# Patient Record
Sex: FEMALE | Race: OTHER | HISPANIC OR LATINO | Employment: OTHER | URBAN - METROPOLITAN AREA
[De-identification: names, ages, dates, MRNs, and addresses within clinical notes are randomized per-mention and may not be internally consistent; named-entity substitution may affect disease eponyms.]

---

## 2023-07-06 ENCOUNTER — OFFICE VISIT (OUTPATIENT)
Dept: URGENT CARE | Facility: PHYSICIAN GROUP | Age: 53
End: 2023-07-06
Payer: OTHER GOVERNMENT

## 2023-07-06 VITALS
TEMPERATURE: 97.5 F | SYSTOLIC BLOOD PRESSURE: 126 MMHG | HEART RATE: 75 BPM | OXYGEN SATURATION: 96 % | BODY MASS INDEX: 23.56 KG/M2 | WEIGHT: 120 LBS | DIASTOLIC BLOOD PRESSURE: 62 MMHG | HEIGHT: 60 IN | RESPIRATION RATE: 16 BRPM

## 2023-07-06 DIAGNOSIS — S05.02XA ABRASION OF LEFT CORNEA, INITIAL ENCOUNTER: ICD-10-CM

## 2023-07-06 PROCEDURE — 99203 OFFICE O/P NEW LOW 30 MIN: CPT | Performed by: STUDENT IN AN ORGANIZED HEALTH CARE EDUCATION/TRAINING PROGRAM

## 2023-07-06 PROCEDURE — 3074F SYST BP LT 130 MM HG: CPT | Performed by: STUDENT IN AN ORGANIZED HEALTH CARE EDUCATION/TRAINING PROGRAM

## 2023-07-06 PROCEDURE — 3078F DIAST BP <80 MM HG: CPT | Performed by: STUDENT IN AN ORGANIZED HEALTH CARE EDUCATION/TRAINING PROGRAM

## 2023-07-06 RX ORDER — ERYTHROMYCIN 5 MG/G
1 OINTMENT OPHTHALMIC 2 TIMES DAILY
Qty: 3.5 G | Refills: 0 | Status: SHIPPED | OUTPATIENT
Start: 2023-07-06 | End: 2023-07-11

## 2023-07-06 RX ORDER — VALACYCLOVIR HYDROCHLORIDE 500 MG/1
TABLET, FILM COATED ORAL
COMMUNITY
Start: 2023-06-23 | End: 2023-06-30

## 2023-07-06 RX ORDER — DOXYCYCLINE HYCLATE 100 MG/1
100 CAPSULE ORAL 2 TIMES DAILY
COMMUNITY
Start: 2023-06-19 | End: 2023-06-30

## 2023-07-06 ASSESSMENT — VISUAL ACUITY: OU: 1

## 2023-07-07 NOTE — PROGRESS NOTES
Subjective:   Елена Fowler is a 52 y.o. female who presents for Eye Drainage (L eye, super watery, mucus like substance in eye, feels like something fuzzy is in her eyes, started last night when she was outside and it was windy )      HPI:  Pleasant 52-year-old female presents the urgent care for left eye irritation and redness that started yesterday.  She states that she was outside in the wind and felt like something got in her eye.  She states that it was instantly irritated and she rubbed her eye to try to alleviate the sensation.  She did flush it with water as well.  Orts that the irritation is to the lateral aspect of her left eye.  Denies fever, chills, blurred vision, double vision, nausea, vomiting, or headache.  She does report increased watering to the eye.  States it has slightly improved today but is still causing her issues.    Medications:    doxycycline Caps  erythromycin Oint  valACYclovir Tabs    Allergies: Benadryl [diphenhydramine] and Penicillins    Problem List: Елена Fowler does not have a problem list on file.    Surgical History:  No past surgical history on file.    Past Social Hx: Елена Fowler       Past Family Hx:  Елена Fowler family history is not on file.     Problem list, medications, and allergies reviewed by myself today in Epic.     Objective:     /62 (BP Location: Right arm, Patient Position: Sitting, BP Cuff Size: Adult)   Pulse 75   Temp 36.4 °C (97.5 °F) (Temporal)   Resp 16   Ht 1.524 m (5')   Wt 54.4 kg (120 lb)   SpO2 96%   BMI 23.44 kg/m²     Physical Exam  Vitals reviewed.   Constitutional:       General: She is not in acute distress.     Appearance: Normal appearance.   HENT:      Head: Normocephalic.      Mouth/Throat:      Mouth: Mucous membranes are moist.   Eyes:      General: Lids are normal. Vision grossly intact. No visual field deficit.        Right eye: No foreign body, discharge or hordeolum.         Left eye: No  foreign body, discharge or hordeolum.      Extraocular Movements: Extraocular movements intact.      Right eye: Normal extraocular motion.      Left eye: Normal extraocular motion.      Conjunctiva/sclera:      Right eye: Right conjunctiva is not injected.      Left eye: Left conjunctiva is injected.      Pupils: Pupils are equal, round, and reactive to light.        Comments: Fluorescein eye exam conducted which shows an approximate 7 mm corneal abrasion to the lateral aspect of the left eye.  No ulceration or discharge.   Cardiovascular:      Rate and Rhythm: Normal rate.      Pulses: Normal pulses.   Pulmonary:      Effort: Pulmonary effort is normal.   Skin:     General: Skin is warm and dry.   Neurological:      Mental Status: She is alert.         Assessment/Plan:     Diagnosis and associated orders:     1. Abrasion of left cornea, initial encounter  erythromycin 5 MG/GM Ointment         Comments/MDM:     Patient's presentation physical exam findings are consistent with corneal abrasion to left eye.  No signs of foreign body still retained at this time.  Erythromycin ointment started for prophylactic bacterial coverage.  Referral to ophthalmology for further evaluation management to make sure that this fully heals.  Denies any acute change in vision.  No blurred vision, double vision, or severe eye pain.  ED/return precautions given.         Differential diagnosis, natural history, supportive care, and indications for immediate follow-up discussed.    Advised the patient to follow-up with the primary care physician for recheck, reevaluation, and consideration of further management.    Please note that this dictation was created using voice recognition software. I have made a reasonable attempt to correct obvious errors, but I expect that there are errors of grammar and possibly content that I did not discover before finalizing the note.    Electronically signed by Shay Mcmillan PA-C.

## 2023-09-19 ENCOUNTER — OFFICE VISIT (OUTPATIENT)
Dept: URGENT CARE | Facility: PHYSICIAN GROUP | Age: 53
End: 2023-09-19
Payer: OTHER GOVERNMENT

## 2023-09-19 VITALS
SYSTOLIC BLOOD PRESSURE: 126 MMHG | WEIGHT: 120 LBS | RESPIRATION RATE: 16 BRPM | HEIGHT: 60 IN | HEART RATE: 69 BPM | TEMPERATURE: 98.4 F | DIASTOLIC BLOOD PRESSURE: 84 MMHG | BODY MASS INDEX: 23.56 KG/M2 | OXYGEN SATURATION: 95 %

## 2023-09-19 DIAGNOSIS — M54.40 BACK PAIN OF LUMBAR REGION WITH SCIATICA: ICD-10-CM

## 2023-09-19 PROCEDURE — 99214 OFFICE O/P EST MOD 30 MIN: CPT | Performed by: PHYSICIAN ASSISTANT

## 2023-09-19 PROCEDURE — 3079F DIAST BP 80-89 MM HG: CPT | Performed by: PHYSICIAN ASSISTANT

## 2023-09-19 PROCEDURE — 3074F SYST BP LT 130 MM HG: CPT | Performed by: PHYSICIAN ASSISTANT

## 2023-09-19 RX ORDER — PREDNISONE 10 MG/1
40 TABLET ORAL DAILY
Qty: 20 TABLET | Refills: 0 | Status: SHIPPED | OUTPATIENT
Start: 2023-09-19 | End: 2023-09-24

## 2023-09-19 RX ORDER — CYCLOBENZAPRINE HCL 5 MG
5-10 TABLET ORAL
Qty: 15 TABLET | Refills: 0 | Status: SHIPPED | OUTPATIENT
Start: 2023-09-19 | End: 2023-09-30

## 2023-09-19 RX ORDER — KETOROLAC TROMETHAMINE 30 MG/ML
30 INJECTION, SOLUTION INTRAMUSCULAR; INTRAVENOUS ONCE
Status: COMPLETED | OUTPATIENT
Start: 2023-09-19 | End: 2023-09-19

## 2023-09-19 RX ADMIN — KETOROLAC TROMETHAMINE 30 MG: 30 INJECTION, SOLUTION INTRAMUSCULAR; INTRAVENOUS at 09:14

## 2023-09-19 ASSESSMENT — ENCOUNTER SYMPTOMS
BACK PAIN: 1
EYE PAIN: 0
SHORTNESS OF BREATH: 0
NAUSEA: 0
MYALGIAS: 0
SORE THROAT: 0
ABDOMINAL PAIN: 0
CHILLS: 0
HEADACHES: 0
VOMITING: 0
COUGH: 0
CONSTIPATION: 0
FEVER: 0
DIARRHEA: 0

## 2023-09-19 NOTE — PROGRESS NOTES
Subjective:   Елена Fowler is a 52 y.o. female who presents for Other (Sciatica pain, x1 week )      Is a 52-year-old female who notes prior medical history of sciatica that improved with epidural injections, last injection was around 1 year ago.  They recently moved to the area and in the process of moving around 7 to 10 days ago started noticing left-sided lumbar back pain as well as a recurrence of her left-sided sciatica.  Originally pain radiated to the left knee and is now moving distally to the foot.  She denies any numbness, weakness, saddle anesthesia, bladder or bowel incontinence    Review of Systems   Constitutional:  Negative for chills and fever.   HENT:  Negative for congestion, ear pain and sore throat.    Eyes:  Negative for pain.   Respiratory:  Negative for cough and shortness of breath.    Cardiovascular:  Negative for chest pain.   Gastrointestinal:  Negative for abdominal pain, constipation, diarrhea, nausea and vomiting.   Genitourinary:  Negative for dysuria.   Musculoskeletal:  Positive for back pain. Negative for myalgias.   Skin:  Negative for rash.   Neurological:  Negative for headaches.       Medications, Allergies, and current problem list reviewed today in Epic.     Objective:     /84 (BP Location: Left arm, Patient Position: Sitting, BP Cuff Size: Adult)   Pulse 69   Temp 36.9 °C (98.4 °F) (Temporal)   Resp 16   Ht 1.524 m (5')   Wt 54.4 kg (120 lb)   SpO2 95%     Physical Exam  Vitals reviewed.   Constitutional:       Appearance: Normal appearance.   HENT:      Head: Normocephalic and atraumatic.      Right Ear: External ear normal.      Left Ear: External ear normal.      Nose: Nose normal.      Mouth/Throat:      Mouth: Mucous membranes are moist.   Eyes:      Conjunctiva/sclera: Conjunctivae normal.   Cardiovascular:      Rate and Rhythm: Normal rate.   Pulmonary:      Effort: Pulmonary effort is normal.   Musculoskeletal:      Comments: Left lumbar paraspinal  and SI joint TTP with muscle spasm.  Flexion extension of the lumbar as well as hips reproduces patient's sciatic pain, unable to tolerate straight leg raise.  Symmetrical 2+ patellar reflexes, ambulatory with a steady station and gait, 5 out of 5 strength of bilateral lower extremities   Skin:     General: Skin is warm and dry.      Capillary Refill: Capillary refill takes less than 2 seconds.   Neurological:      Mental Status: She is alert and oriented to person, place, and time.         Assessment/Plan:     Diagnosis and associated orders:     1. Back pain of lumbar region with sciatica  predniSONE (DELTASONE) 10 MG Tab    ketorolac (Toradol) injection 30 mg    cyclobenzaprine (FLEXERIL) 5 mg tablet    Referral to Pain Clinic         Comments/MDM:     Toradol provided in clinic, Flexeril instructed to be used only before bed, patient warned about sedation.  Start prednisone today.  No NSAIDs for the next 24 hours after Toradol injection and then can restart alternating Tylenol and ibuprofen.  Provided clinical advisor printed out on lumbar back pain with emphasis on sciatica stretching.  Referral to interventional pain as she has benefited from this in the past.  No sign of cord compression syndrome, follow-up as needed         Differential diagnosis, natural history, supportive care, and indications for immediate follow-up discussed.    Advised the patient to follow-up with the primary care physician for recheck, reevaluation, and consideration of further management.    Please note that this dictation was created using voice recognition software. I have made a reasonable attempt to correct obvious errors, but I expect that there are errors of grammar and possibly content that I did not discover before finalizing the note.    This note was electronically signed by Jhonatan Fischer PA-C

## 2023-09-29 ENCOUNTER — TELEPHONE (OUTPATIENT)
Dept: HEALTH INFORMATION MANAGEMENT | Facility: OTHER | Age: 53
End: 2023-09-29
Payer: OTHER GOVERNMENT

## 2023-10-12 ENCOUNTER — OFFICE VISIT (OUTPATIENT)
Dept: URGENT CARE | Facility: PHYSICIAN GROUP | Age: 53
End: 2023-10-12
Payer: OTHER GOVERNMENT

## 2023-10-12 VITALS
BODY MASS INDEX: 25.52 KG/M2 | OXYGEN SATURATION: 93 % | TEMPERATURE: 97.6 F | SYSTOLIC BLOOD PRESSURE: 124 MMHG | HEART RATE: 71 BPM | DIASTOLIC BLOOD PRESSURE: 74 MMHG | RESPIRATION RATE: 20 BRPM | WEIGHT: 130 LBS | HEIGHT: 60 IN

## 2023-10-12 DIAGNOSIS — B00.9 HERPES: ICD-10-CM

## 2023-10-12 PROCEDURE — 3078F DIAST BP <80 MM HG: CPT

## 2023-10-12 PROCEDURE — 99213 OFFICE O/P EST LOW 20 MIN: CPT

## 2023-10-12 PROCEDURE — 3074F SYST BP LT 130 MM HG: CPT

## 2023-10-12 RX ORDER — VALACYCLOVIR HYDROCHLORIDE 500 MG/1
500 TABLET, FILM COATED ORAL 2 TIMES DAILY
Qty: 6 TABLET | Refills: 2 | Status: SHIPPED | OUTPATIENT
Start: 2023-10-12 | End: 2023-10-15

## 2023-10-12 NOTE — PROGRESS NOTES
Subjective     Елена Fowler is a very pleasant 52 y.o. female who presents with Other (Personal )            HPI patient arrives here with her .  Patient states she does have a history of genital herpes, she states whenever she has a lot of stress she will have an outbreak.  She typically has a prescription of valacyclovir at home to take, at 500 mg twice a day for 3 days when this occurs.  However she and her  just moved to [place] from [facility] in June.  With the stress of the move she had used up her 1 prescription she had left.  She states he did have a lot of company the last few days, and she began to feel that she was having an outbreak yesterday.  She states she does have an appointment to establish with a PCP here at the [facility] in December, she is hoping to be treated at this time and possibly have a refill in case she has an outbreak prior to her next appointment.      ROS Same as above        Allergies   Allergen Reactions    Benadryl [Diphenhydramine]     Penicillins        Current Outpatient Medications   Medication Sig    valACYclovir (VALTREX) 500 MG Tab Take 1 Tablet by mouth 2 times a day for 3 days.        No past medical history on file.       Objective     /74 (BP Location: Right arm, Patient Position: Sitting, BP Cuff Size: Adult)   Pulse 71   Temp 36.4 °C (97.6 °F) (Temporal)   Resp 20   Ht 1.524 m (5')   Wt 59 kg (130 lb)   SpO2 93%   BMI 25.39 kg/m²      Physical Exam  Vitals reviewed.   Constitutional:       Appearance: Normal appearance. She is not ill-appearing.   HENT:      Head: Normocephalic and atraumatic.      Mouth/Throat:      Mouth: Mucous membranes are moist.   Eyes:      Extraocular Movements: Extraocular movements intact.      Conjunctiva/sclera: Conjunctivae normal.   Cardiovascular:      Rate and Rhythm: Normal rate.   Pulmonary:      Effort: Pulmonary effort is normal.   Musculoskeletal:         General: Normal  range of motion.      Cervical back: Normal range of motion and neck supple.   Skin:     General: Skin is warm and dry.   Neurological:      Mental Status: She is alert and oriented to person, place, and time.           Assessment & Plan     Patient comes in today with her .  She states she does have a history of genital herpes, which she has had for many years.  She states yesterday she started to feel like she was going to have an outbreak, the discomfort has continued.  She and her  are both retired and they recently moved to the [facility] and she does not currently have a primary care provider.  She does have an appointment in December to get established for further medical management.    She does decline physical exam today in clinic.  We discussed her history.  Patient verifies that valacyclovir at 500 mg 2 times daily for 3 days has always taken care of any outbreaks for her in the past.  Discussed we will write for valacyclovir 500 mg 2 times daily for 3 days with 2 refills for her in case she needs them for any outbreaks prior to her appointment in December to establish with primary care.  Patient and spouse's understanding and agreement plan of care. Differential diagnosis, natural history, supportive care, and indications for immediate follow-up were discussed.       1. Herpes  valACYclovir (VALTREX) 500 MG Tab

## 2023-12-08 SDOH — ECONOMIC STABILITY: INCOME INSECURITY: HOW HARD IS IT FOR YOU TO PAY FOR THE VERY BASICS LIKE FOOD, HOUSING, MEDICAL CARE, AND HEATING?: PATIENT DECLINED

## 2023-12-08 SDOH — ECONOMIC STABILITY: HOUSING INSECURITY: IN THE LAST 12 MONTHS, HOW MANY PLACES HAVE YOU LIVED?: 3

## 2023-12-08 SDOH — ECONOMIC STABILITY: HOUSING INSECURITY
IN THE LAST 12 MONTHS, WAS THERE A TIME WHEN YOU DID NOT HAVE A STEADY PLACE TO SLEEP OR SLEPT IN A SHELTER (INCLUDING NOW)?: PATIENT REFUSED

## 2023-12-08 SDOH — ECONOMIC STABILITY: FOOD INSECURITY: WITHIN THE PAST 12 MONTHS, THE FOOD YOU BOUGHT JUST DIDN'T LAST AND YOU DIDN'T HAVE MONEY TO GET MORE.: PATIENT DECLINED

## 2023-12-08 SDOH — HEALTH STABILITY: MENTAL HEALTH
STRESS IS WHEN SOMEONE FEELS TENSE, NERVOUS, ANXIOUS, OR CAN'T SLEEP AT NIGHT BECAUSE THEIR MIND IS TROUBLED. HOW STRESSED ARE YOU?: VERY MUCH

## 2023-12-08 SDOH — HEALTH STABILITY: PHYSICAL HEALTH: ON AVERAGE, HOW MANY DAYS PER WEEK DO YOU ENGAGE IN MODERATE TO STRENUOUS EXERCISE (LIKE A BRISK WALK)?: 0 DAYS

## 2023-12-08 SDOH — ECONOMIC STABILITY: HOUSING INSECURITY
IN THE LAST 12 MONTHS, WAS THERE A TIME WHEN YOU DID NOT HAVE A STEADY PLACE TO SLEEP OR SLEPT IN A SHELTER (INCLUDING NOW)?: NO

## 2023-12-08 SDOH — ECONOMIC STABILITY: FOOD INSECURITY: WITHIN THE PAST 12 MONTHS, YOU WORRIED THAT YOUR FOOD WOULD RUN OUT BEFORE YOU GOT MONEY TO BUY MORE.: PATIENT DECLINED

## 2023-12-08 SDOH — ECONOMIC STABILITY: INCOME INSECURITY: IN THE LAST 12 MONTHS, WAS THERE A TIME WHEN YOU WERE NOT ABLE TO PAY THE MORTGAGE OR RENT ON TIME?: PATIENT REFUSED

## 2023-12-08 SDOH — HEALTH STABILITY: PHYSICAL HEALTH: ON AVERAGE, HOW MANY MINUTES DO YOU ENGAGE IN EXERCISE AT THIS LEVEL?: 0 MIN

## 2023-12-08 SDOH — ECONOMIC STABILITY: TRANSPORTATION INSECURITY
IN THE PAST 12 MONTHS, HAS LACK OF TRANSPORTATION KEPT YOU FROM MEETINGS, WORK, OR FROM GETTING THINGS NEEDED FOR DAILY LIVING?: NO

## 2023-12-08 SDOH — ECONOMIC STABILITY: TRANSPORTATION INSECURITY
IN THE PAST 12 MONTHS, HAS THE LACK OF TRANSPORTATION KEPT YOU FROM MEDICAL APPOINTMENTS OR FROM GETTING MEDICATIONS?: NO

## 2023-12-08 SDOH — ECONOMIC STABILITY: TRANSPORTATION INSECURITY
IN THE PAST 12 MONTHS, HAS LACK OF RELIABLE TRANSPORTATION KEPT YOU FROM MEDICAL APPOINTMENTS, MEETINGS, WORK OR FROM GETTING THINGS NEEDED FOR DAILY LIVING?: NO

## 2023-12-08 ASSESSMENT — SOCIAL DETERMINANTS OF HEALTH (SDOH)
HOW OFTEN DO YOU ATTEND CHURCH OR RELIGIOUS SERVICES?: PATIENT DECLINED
HOW OFTEN DO YOU GET TOGETHER WITH FRIENDS OR RELATIVES?: PATIENT DECLINED
WITHIN THE PAST 12 MONTHS, YOU WORRIED THAT YOUR FOOD WOULD RUN OUT BEFORE YOU GOT THE MONEY TO BUY MORE: PATIENT DECLINED
HOW OFTEN DO YOU HAVE A DRINK CONTAINING ALCOHOL: PATIENT DECLINED
HOW OFTEN DO YOU ATTEND CHURCH OR RELIGIOUS SERVICES?: PATIENT DECLINED
DO YOU BELONG TO ANY CLUBS OR ORGANIZATIONS SUCH AS CHURCH GROUPS UNIONS, FRATERNAL OR ATHLETIC GROUPS, OR SCHOOL GROUPS?: PATIENT DECLINED
HOW OFTEN DO YOU ATTENT MEETINGS OF THE CLUB OR ORGANIZATION YOU BELONG TO?: PATIENT DECLINED
HOW MANY DRINKS CONTAINING ALCOHOL DO YOU HAVE ON A TYPICAL DAY WHEN YOU ARE DRINKING: PATIENT DECLINED
DO YOU BELONG TO ANY CLUBS OR ORGANIZATIONS SUCH AS CHURCH GROUPS UNIONS, FRATERNAL OR ATHLETIC GROUPS, OR SCHOOL GROUPS?: PATIENT DECLINED
HOW HARD IS IT FOR YOU TO PAY FOR THE VERY BASICS LIKE FOOD, HOUSING, MEDICAL CARE, AND HEATING?: PATIENT DECLINED
HOW OFTEN DO YOU GET TOGETHER WITH FRIENDS OR RELATIVES?: PATIENT DECLINED
IN A TYPICAL WEEK, HOW MANY TIMES DO YOU TALK ON THE PHONE WITH FAMILY, FRIENDS, OR NEIGHBORS?: MORE THAN THREE TIMES A WEEK
IN A TYPICAL WEEK, HOW MANY TIMES DO YOU TALK ON THE PHONE WITH FAMILY, FRIENDS, OR NEIGHBORS?: MORE THAN THREE TIMES A WEEK
HOW OFTEN DO YOU ATTENT MEETINGS OF THE CLUB OR ORGANIZATION YOU BELONG TO?: PATIENT DECLINED
HOW OFTEN DO YOU HAVE SIX OR MORE DRINKS ON ONE OCCASION: PATIENT DECLINED

## 2023-12-08 ASSESSMENT — LIFESTYLE VARIABLES
AUDIT-C TOTAL SCORE: -1
HOW OFTEN DO YOU HAVE SIX OR MORE DRINKS ON ONE OCCASION: PATIENT DECLINED
HOW MANY STANDARD DRINKS CONTAINING ALCOHOL DO YOU HAVE ON A TYPICAL DAY: PATIENT DECLINED
HOW OFTEN DO YOU HAVE A DRINK CONTAINING ALCOHOL: PATIENT DECLINED
SKIP TO QUESTIONS 9-10: 0

## 2023-12-11 ENCOUNTER — OFFICE VISIT (OUTPATIENT)
Dept: MEDICAL GROUP | Facility: PHYSICIAN GROUP | Age: 53
End: 2023-12-11
Payer: OTHER GOVERNMENT

## 2023-12-11 VITALS
DIASTOLIC BLOOD PRESSURE: 70 MMHG | HEIGHT: 60 IN | BODY MASS INDEX: 24.35 KG/M2 | HEART RATE: 68 BPM | WEIGHT: 124 LBS | TEMPERATURE: 98 F | SYSTOLIC BLOOD PRESSURE: 128 MMHG | OXYGEN SATURATION: 95 % | RESPIRATION RATE: 14 BRPM

## 2023-12-11 DIAGNOSIS — K21.9 GASTROESOPHAGEAL REFLUX DISEASE WITHOUT ESOPHAGITIS: ICD-10-CM

## 2023-12-11 DIAGNOSIS — Z76.89 ENCOUNTER TO ESTABLISH CARE: ICD-10-CM

## 2023-12-11 DIAGNOSIS — Z11.59 NEED FOR HEPATITIS C SCREENING TEST: ICD-10-CM

## 2023-12-11 DIAGNOSIS — Z12.11 SCREENING FOR COLORECTAL CANCER: ICD-10-CM

## 2023-12-11 DIAGNOSIS — F41.9 ANXIETY: ICD-10-CM

## 2023-12-11 DIAGNOSIS — K92.1 BLACK STOOL: ICD-10-CM

## 2023-12-11 DIAGNOSIS — E55.9 VITAMIN D DEFICIENCY: ICD-10-CM

## 2023-12-11 DIAGNOSIS — R63.4 WEIGHT LOSS: ICD-10-CM

## 2023-12-11 DIAGNOSIS — G89.29 CHRONIC LEFT-SIDED LOW BACK PAIN WITH LEFT-SIDED SCIATICA: ICD-10-CM

## 2023-12-11 DIAGNOSIS — Z91.89 AT RISK FOR BREAST CANCER: ICD-10-CM

## 2023-12-11 DIAGNOSIS — Z13.0 SCREENING FOR DEFICIENCY ANEMIA: ICD-10-CM

## 2023-12-11 DIAGNOSIS — Z12.31 ENCOUNTER FOR SCREENING MAMMOGRAM FOR BREAST CANCER: ICD-10-CM

## 2023-12-11 DIAGNOSIS — M54.42 CHRONIC LEFT-SIDED LOW BACK PAIN WITH LEFT-SIDED SCIATICA: ICD-10-CM

## 2023-12-11 DIAGNOSIS — F33.1 MODERATE EPISODE OF RECURRENT MAJOR DEPRESSIVE DISORDER (HCC): ICD-10-CM

## 2023-12-11 DIAGNOSIS — Z13.79 GENETIC SCREENING: ICD-10-CM

## 2023-12-11 DIAGNOSIS — Z13.6 SCREENING FOR CARDIOVASCULAR CONDITION: ICD-10-CM

## 2023-12-11 DIAGNOSIS — R10.84 GENERALIZED ABDOMINAL PAIN: ICD-10-CM

## 2023-12-11 DIAGNOSIS — Z11.4 ENCOUNTER FOR SCREENING FOR HIV: ICD-10-CM

## 2023-12-11 DIAGNOSIS — Z13.29 THYROID DISORDER SCREEN: ICD-10-CM

## 2023-12-11 DIAGNOSIS — Z12.12 SCREENING FOR COLORECTAL CANCER: ICD-10-CM

## 2023-12-11 DIAGNOSIS — A60.9 HSV (HERPES SIMPLEX VIRUS) ANOGENITAL INFECTION: ICD-10-CM

## 2023-12-11 PROBLEM — J32.9 SINUSITIS: Status: ACTIVE | Noted: 2023-12-11

## 2023-12-11 PROBLEM — M21.619 BUNION: Status: ACTIVE | Noted: 2023-12-11

## 2023-12-11 PROBLEM — G56.00 CARPAL TUNNEL SYNDROME: Status: ACTIVE | Noted: 2023-12-11

## 2023-12-11 PROBLEM — N63.0 MASS OF BREAST: Status: ACTIVE | Noted: 2023-12-11

## 2023-12-11 PROBLEM — F32.9 MAJOR DEPRESSIVE DISORDER: Status: ACTIVE | Noted: 2023-12-11

## 2023-12-11 PROBLEM — M54.50 LOWER BACK PAIN: Status: ACTIVE | Noted: 2023-12-11

## 2023-12-11 PROBLEM — F32.A DEPRESSION: Status: ACTIVE | Noted: 2023-12-11

## 2023-12-11 PROBLEM — R10.9 ABDOMINAL PAIN: Status: ACTIVE | Noted: 2023-12-11

## 2023-12-11 PROBLEM — G47.00 INSOMNIA: Status: ACTIVE | Noted: 2023-12-11

## 2023-12-11 PROCEDURE — 99214 OFFICE O/P EST MOD 30 MIN: CPT

## 2023-12-11 PROCEDURE — 3074F SYST BP LT 130 MM HG: CPT

## 2023-12-11 PROCEDURE — 3078F DIAST BP <80 MM HG: CPT

## 2023-12-11 RX ORDER — VALACYCLOVIR HYDROCHLORIDE 500 MG/1
500 TABLET, FILM COATED ORAL 2 TIMES DAILY
COMMUNITY
End: 2023-12-11 | Stop reason: SDUPTHER

## 2023-12-11 RX ORDER — VALACYCLOVIR HYDROCHLORIDE 500 MG/1
500 TABLET, FILM COATED ORAL 2 TIMES DAILY
Qty: 40 TABLET | Refills: 1 | Status: SHIPPED | OUTPATIENT
Start: 2023-12-11

## 2023-12-11 ASSESSMENT — ENCOUNTER SYMPTOMS
DEPRESSION: 1
ABDOMINAL PAIN: 1
WEIGHT LOSS: 1
NERVOUS/ANXIOUS: 1
INSOMNIA: 1
HEARTBURN: 1

## 2023-12-11 ASSESSMENT — PATIENT HEALTH QUESTIONNAIRE - PHQ9
CLINICAL INTERPRETATION OF PHQ2 SCORE: 6
5. POOR APPETITE OR OVEREATING: 3 - NEARLY EVERY DAY
SUM OF ALL RESPONSES TO PHQ QUESTIONS 1-9: 15

## 2023-12-11 NOTE — ASSESSMENT & PLAN NOTE
Chronic, unstable. Reports increased recently with increased anxiety/life changes.  Taking pepto bismol intermittently for this.

## 2023-12-11 NOTE — ASSESSMENT & PLAN NOTE
Chronic, stable. Taking valacyclovir upon onset of prodrome. Reports outbreak with stressors.  Continue valacyclovir 500 mg twice daily for 3 days

## 2023-12-11 NOTE — ASSESSMENT & PLAN NOTE
Chronic, unstable. Intermittent abdominal pain. Reports intermittent loose stool/diarrhea. Reports one episode of black stools lasting about 1 week, reports this has resolved.   Reports she does take pepto bismol intermittently for GERD, ibuprofen for pain occasionally, but this bothers her stomach.

## 2023-12-11 NOTE — ASSESSMENT & PLAN NOTE
Chronic, unstable. Reports increase in anxiety over the past year. Recently moved to [facility].  Using cannabis for this.

## 2023-12-11 NOTE — ASSESSMENT & PLAN NOTE
Chronic, unstable. Has had injections to low back for pain.   Requesting local referral for ongoing care.

## 2023-12-11 NOTE — ASSESSMENT & PLAN NOTE
Chronic, unstable. Has had about 30 lb weight loss in the past 6 months. No effort to lose weight, but has had increased life stressors

## 2023-12-11 NOTE — PROGRESS NOTES
Subjective:     CC: Diagnoses of Chronic left-sided low back pain with left-sided sciatica, HSV (herpes simplex virus) anogenital infection, Anxiety, Encounter to establish care, Genetic screening, Encounter for screening mammogram for breast cancer, Screening for colorectal cancer, At risk for breast cancer, Moderate episode of recurrent major depressive disorder (HCC), Thyroid disorder screen, Vitamin D deficiency, Screening for cardiovascular condition, Need for hepatitis C screening test, Encounter for screening for HIV, Screening for deficiency anemia, Generalized abdominal pain, Black stool, Gastroesophageal reflux disease without esophagitis, and Weight loss were pertinent to this visit.    Pt presents today to establish care with me, prior pcp in [place]. Recently moved to [place] with spouse to live closer to family, has retired.    HPI:   Елена presents today with    Problem   Sinusitis   Mass of Breast   Lower Back Pain   Insomnia    will try ambien 5mg qhs prn, may repeat in 30 min #30, hand written.     Depression    will start Lexapro 10mg daily, f/u 3 weeks.     Carpal Tunnel Syndrome   Bunion   Abdominal Pain   Hsv (Herpes Simplex Virus) Anogenital Infection   Anxiety   Major Depressive Disorder   Black Stool   Gastroesophageal Reflux Disease Without Esophagitis   Weight Loss     ROS:  Review of Systems   Constitutional:  Positive for weight loss.   Gastrointestinal:  Positive for abdominal pain, heartburn and melena.   Psychiatric/Behavioral:  Positive for depression. The patient is nervous/anxious and has insomnia.    All other systems reviewed and are negative.      Objective:     Exam:  /70 (BP Location: Left arm, Patient Position: Sitting, BP Cuff Size: Adult)   Pulse 68   Temp 36.7 °C (98 °F) (Temporal)   Resp 14   Ht 1.524 m (5')   Wt 56.2 kg (124 lb)   SpO2 95%   BMI 24.22 kg/m²  Body mass index is 24.22 kg/m².    Physical Exam  Vitals reviewed.   Constitutional:       General: She is  not in acute distress.     Appearance: Normal appearance. She is not ill-appearing.   HENT:      Head: Normocephalic and atraumatic.   Cardiovascular:      Rate and Rhythm: Normal rate.      Pulses: Normal pulses.   Pulmonary:      Effort: Pulmonary effort is normal. No respiratory distress.   Skin:     General: Skin is warm and dry.      Findings: No rash.   Neurological:      General: No focal deficit present.      Mental Status: She is alert and oriented to person, place, and time.   Psychiatric:         Mood and Affect: Mood normal.         Behavior: Behavior normal.       Assessment & Plan:     52 y.o. female with the following -     Problem List Items Addressed This Visit       Lower back pain     Chronic, unstable. Has had injections to low back for pain.   Requesting local referral for ongoing care.          Relevant Orders    Referral to Pain Management    Abdominal pain     Chronic, unstable. Intermittent abdominal pain. Reports intermittent loose stool/diarrhea. Reports one episode of black stools lasting about 1 week, reports this has resolved.   Reports she does take pepto bismol intermittently for GERD, ibuprofen for pain occasionally, but this bothers her stomach.          Relevant Orders    Referral to GI for Colonoscopy    HSV (herpes simplex virus) anogenital infection     Chronic, stable. Taking valacyclovir upon onset of prodrome. Reports outbreak with stressors.  Continue valacyclovir 500 mg twice daily for 3 days          Relevant Medications    valACYclovir (VALTREX) 500 MG Tab    Anxiety     Chronic, unstable. Reports increase in anxiety over the past year. Recently moved to [facility].  Using cannabis for this.         Relevant Orders    Referral to Psychology    Major depressive disorder     Chronic, unstable. Requesting referral for CBT.          Relevant Orders    Patient has been identified as having a positive depression screening. Appropriate orders and counseling have been given.  (Completed)    Referral to Psychology    Black stool    Relevant Orders    Referral to GI for Colonoscopy    Gastroesophageal reflux disease without esophagitis     Chronic, unstable. Reports increased recently with increased anxiety/life changes.  Taking pepto bismol intermittently for this.          Relevant Orders    Referral to GI for Colonoscopy    Weight loss     Chronic, unstable. Has had about 30 lb weight loss in the past 6 months. No effort to lose weight, but has had increased life stressors         Relevant Orders    Referral to GI for Colonoscopy     Other Visit Diagnoses       Encounter to establish care        Genetic screening        Relevant Orders    Referral to Genetic Research Studies    Encounter for screening mammogram for breast cancer        Screening for colorectal cancer        Relevant Orders    Referral to GI for Colonoscopy    At risk for breast cancer        Relevant Orders    MA-SCREENING MAMMO BILAT W/TOMOSYNTHESIS W/CAD    Thyroid disorder screen        Relevant Orders    TSH    Vitamin D deficiency        Relevant Orders    VITAMIN D,25 HYDROXY (DEFICIENCY)    Screening for cardiovascular condition        Relevant Orders    Comp Metabolic Panel    Lipid Profile    Need for hepatitis C screening test        Relevant Orders    HEP C VIRUS ANTIBODY    Encounter for screening for HIV        Relevant Orders    HIV AG/AB COMBO ASSAY SCREENING    Screening for deficiency anemia        Relevant Orders    CBC WITHOUT DIFFERENTIAL          Patient was educated in proper administration of medication(s) ordered today including safety, possible SE, risks, benefits, rationale and alternatives to therapy.   Supportive care, differential diagnoses, and indications for immediate follow-up discussed with patient.    Pathogenesis of diagnosis discussed including typical length and natural progression.    Instructed to return to clinic or nearest emergency department for any change in condition,  further concerns, or worsening of symptoms.  Patient states understanding of the plan of care and discharge instructions.    Return in about 4 weeks (around 1/8/2024) for Labs, physical .    Please note that this dictation was created using voice recognition software. I have made every reasonable attempt to correct obvious errors, but I expect that there are errors of grammar and possibly content that I did not discover before finalizing the note.

## 2024-01-03 ENCOUNTER — HOSPITAL ENCOUNTER (OUTPATIENT)
Dept: RADIOLOGY | Facility: MEDICAL CENTER | Age: 54
End: 2024-01-03
Payer: OTHER GOVERNMENT

## 2024-01-03 DIAGNOSIS — Z91.89 AT RISK FOR BREAST CANCER: ICD-10-CM

## 2024-01-03 PROCEDURE — 77067 SCR MAMMO BI INCL CAD: CPT

## 2024-01-08 ENCOUNTER — RESEARCH ENCOUNTER (OUTPATIENT)
Dept: RESEARCH | Facility: MEDICAL CENTER | Age: 54
End: 2024-01-08

## 2024-01-08 NOTE — RESEARCH NOTE
Patient has been referred by JENNIFER Rajan. Consent form(s) have been pushed to the patient's MyChart. Communication is being tracked on the referral.

## 2024-01-10 ENCOUNTER — APPOINTMENT (OUTPATIENT)
Dept: LAB | Facility: MEDICAL CENTER | Age: 54
End: 2024-01-10
Payer: OTHER GOVERNMENT

## 2024-02-22 ENCOUNTER — HOSPITAL ENCOUNTER (OUTPATIENT)
Dept: LAB | Facility: MEDICAL CENTER | Age: 54
End: 2024-02-22
Payer: OTHER GOVERNMENT

## 2024-02-22 DIAGNOSIS — Z13.0 SCREENING FOR DEFICIENCY ANEMIA: ICD-10-CM

## 2024-02-22 DIAGNOSIS — E55.9 VITAMIN D DEFICIENCY: ICD-10-CM

## 2024-02-22 DIAGNOSIS — Z11.59 NEED FOR HEPATITIS C SCREENING TEST: ICD-10-CM

## 2024-02-22 DIAGNOSIS — Z13.6 SCREENING FOR CARDIOVASCULAR CONDITION: ICD-10-CM

## 2024-02-22 DIAGNOSIS — Z13.29 THYROID DISORDER SCREEN: ICD-10-CM

## 2024-02-22 DIAGNOSIS — Z11.4 ENCOUNTER FOR SCREENING FOR HIV: ICD-10-CM

## 2024-02-22 LAB
25(OH)D3 SERPL-MCNC: 10 NG/ML (ref 30–100)
ALBUMIN SERPL BCP-MCNC: 4.5 G/DL (ref 3.2–4.9)
ALBUMIN/GLOB SERPL: 1.7 G/DL
ALP SERPL-CCNC: 77 U/L (ref 30–99)
ALT SERPL-CCNC: 23 U/L (ref 2–50)
ANION GAP SERPL CALC-SCNC: 12 MMOL/L (ref 7–16)
AST SERPL-CCNC: 18 U/L (ref 12–45)
BILIRUB SERPL-MCNC: 1.2 MG/DL (ref 0.1–1.5)
BUN SERPL-MCNC: 9 MG/DL (ref 8–22)
CALCIUM ALBUM COR SERPL-MCNC: 8.9 MG/DL (ref 8.5–10.5)
CALCIUM SERPL-MCNC: 9.3 MG/DL (ref 8.5–10.5)
CHLORIDE SERPL-SCNC: 106 MMOL/L (ref 96–112)
CHOLEST SERPL-MCNC: 250 MG/DL (ref 100–199)
CO2 SERPL-SCNC: 24 MMOL/L (ref 20–33)
CREAT SERPL-MCNC: 0.56 MG/DL (ref 0.5–1.4)
ERYTHROCYTE [DISTWIDTH] IN BLOOD BY AUTOMATED COUNT: 42.3 FL (ref 35.9–50)
FASTING STATUS PATIENT QL REPORTED: NORMAL
GFR SERPLBLD CREATININE-BSD FMLA CKD-EPI: 108 ML/MIN/1.73 M 2
GLOBULIN SER CALC-MCNC: 2.6 G/DL (ref 1.9–3.5)
GLUCOSE SERPL-MCNC: 98 MG/DL (ref 65–99)
HCT VFR BLD AUTO: 52.8 % (ref 37–47)
HCV AB SER QL: NORMAL
HDLC SERPL-MCNC: 53 MG/DL
HGB BLD-MCNC: 16.9 G/DL (ref 12–16)
HIV 1+2 AB+HIV1 P24 AG SERPL QL IA: NORMAL
LDLC SERPL CALC-MCNC: 161 MG/DL
MCH RBC QN AUTO: 28.8 PG (ref 27–33)
MCHC RBC AUTO-ENTMCNC: 32 G/DL (ref 32.2–35.5)
MCV RBC AUTO: 90.1 FL (ref 81.4–97.8)
PLATELET # BLD AUTO: 293 K/UL (ref 164–446)
PMV BLD AUTO: 11.8 FL (ref 9–12.9)
POTASSIUM SERPL-SCNC: 4.3 MMOL/L (ref 3.6–5.5)
PROT SERPL-MCNC: 7.1 G/DL (ref 6–8.2)
RBC # BLD AUTO: 5.86 M/UL (ref 4.2–5.4)
SODIUM SERPL-SCNC: 142 MMOL/L (ref 135–145)
TRIGL SERPL-MCNC: 179 MG/DL (ref 0–149)
TSH SERPL DL<=0.005 MIU/L-ACNC: 1.44 UIU/ML (ref 0.38–5.33)
WBC # BLD AUTO: 6.5 K/UL (ref 4.8–10.8)

## 2024-02-22 PROCEDURE — 87389 HIV-1 AG W/HIV-1&-2 AB AG IA: CPT

## 2024-02-22 PROCEDURE — 86803 HEPATITIS C AB TEST: CPT

## 2024-02-22 PROCEDURE — 82306 VITAMIN D 25 HYDROXY: CPT

## 2024-02-22 PROCEDURE — 36415 COLL VENOUS BLD VENIPUNCTURE: CPT

## 2024-02-22 PROCEDURE — 80061 LIPID PANEL: CPT

## 2024-02-22 PROCEDURE — 84443 ASSAY THYROID STIM HORMONE: CPT

## 2024-02-22 PROCEDURE — 80053 COMPREHEN METABOLIC PANEL: CPT

## 2024-02-22 PROCEDURE — 85027 COMPLETE CBC AUTOMATED: CPT

## 2024-02-24 PROCEDURE — 87338 HPYLORI STOOL AG IA: CPT

## 2024-02-28 ENCOUNTER — HOSPITAL ENCOUNTER (OUTPATIENT)
Facility: MEDICAL CENTER | Age: 54
End: 2024-02-28
Payer: OTHER GOVERNMENT

## 2024-02-28 ENCOUNTER — OFFICE VISIT (OUTPATIENT)
Dept: MEDICAL GROUP | Facility: PHYSICIAN GROUP | Age: 54
End: 2024-02-28
Payer: OTHER GOVERNMENT

## 2024-02-28 VITALS
WEIGHT: 122 LBS | SYSTOLIC BLOOD PRESSURE: 118 MMHG | OXYGEN SATURATION: 94 % | BODY MASS INDEX: 23.95 KG/M2 | HEART RATE: 65 BPM | HEIGHT: 60 IN | RESPIRATION RATE: 18 BRPM | DIASTOLIC BLOOD PRESSURE: 80 MMHG | TEMPERATURE: 97.6 F

## 2024-02-28 DIAGNOSIS — E55.9 VITAMIN D DEFICIENCY: ICD-10-CM

## 2024-02-28 DIAGNOSIS — E78.2 MIXED HYPERLIPIDEMIA: ICD-10-CM

## 2024-02-28 DIAGNOSIS — D75.1 ERYTHROCYTOSIS: ICD-10-CM

## 2024-02-28 DIAGNOSIS — R10.13 EPIGASTRIC PAIN: ICD-10-CM

## 2024-02-28 LAB — H PYLORI AG STL QL IA: NOT DETECTED

## 2024-02-28 PROCEDURE — 99214 OFFICE O/P EST MOD 30 MIN: CPT

## 2024-02-28 PROCEDURE — 3079F DIAST BP 80-89 MM HG: CPT

## 2024-02-28 PROCEDURE — 3074F SYST BP LT 130 MM HG: CPT

## 2024-02-28 PROCEDURE — 87338 HPYLORI STOOL AG IA: CPT

## 2024-02-28 RX ORDER — FAMOTIDINE 20 MG/1
20 TABLET, FILM COATED ORAL 2 TIMES DAILY
Qty: 60 TABLET | Refills: 1 | Status: SHIPPED | OUTPATIENT
Start: 2024-02-28

## 2024-02-28 RX ORDER — OMEPRAZOLE 20 MG/1
20 CAPSULE, DELAYED RELEASE ORAL DAILY
Qty: 30 CAPSULE | Refills: 1 | Status: SHIPPED | OUTPATIENT
Start: 2024-02-28

## 2024-02-28 RX ORDER — ERGOCALCIFEROL 1.25 MG/1
50000 CAPSULE ORAL
Qty: 12 CAPSULE | Refills: 0 | Status: SHIPPED | OUTPATIENT
Start: 2024-02-28

## 2024-02-28 ASSESSMENT — FIBROSIS 4 INDEX: FIB4 SCORE: 0.7

## 2024-02-28 ASSESSMENT — PATIENT HEALTH QUESTIONNAIRE - PHQ9
5. POOR APPETITE OR OVEREATING: 3 - NEARLY EVERY DAY
SUM OF ALL RESPONSES TO PHQ QUESTIONS 1-9: 18
CLINICAL INTERPRETATION OF PHQ2 SCORE: 6

## 2024-02-28 ASSESSMENT — ENCOUNTER SYMPTOMS
DIARRHEA: 1
ABDOMINAL PAIN: 1
NERVOUS/ANXIOUS: 1

## 2024-02-28 NOTE — ASSESSMENT & PLAN NOTE
Chronic, unstable. Discussed healthy lifestyle recommendations.   Plan to recheck lipid panel 6 months, consider ct cardiac score  The 10-year ASCVD risk score (Lauren TANNER, et al., 2019) is: 5.4%     Rhombic Flap Text: The defect edges were debeveled with a #15 scalpel blade.  Given the location of the defect and the proximity to free margins a rhombic flap was deemed most appropriate.  Using a sterile surgical marker, an appropriate rhombic flap was drawn incorporating the defect.    The area thus outlined was incised deep to adipose tissue with a #15 scalpel blade.  The skin margins were undermined to an appropriate distance in all directions utilizing iris scissors.

## 2024-02-28 NOTE — ASSESSMENT & PLAN NOTE
Chronic, ongoing. Continues to experience abdominal pain, 10/10 in the morning daily with burning cramping pain to epigastric area. Pain lessens over the course of the day but is never a 0/10. Also reports diarrhea, early satiety, reduced appetite, pain occasionally waking her up, and interrupting sleep  Has found some relief with herbal tea in the morning to reduce intensity of pain.   Has modified diet to reduce acid/spicy foods  Has experienced increased life stressors    Hpylori- treat if positive  After completed, recommend starting omeprazole daily on an empty stomach and famotidine nightly  Consider CT scan of abdomen if no resolution

## 2024-02-28 NOTE — ASSESSMENT & PLAN NOTE
Chronic, unstable. Will provide ergocalciferol 37898 iu once weekly for 12 weeks, followed by once daily vit d3 otc paired with calcium to optimize absorption.

## 2024-02-28 NOTE — PROGRESS NOTES
Subjective:     CC: Diagnoses of Epigastric pain, Vitamin D deficiency, Mixed hyperlipidemia, and Erythrocytosis were pertinent to this visit.    HPI:   Елена presents today with    Problem   Vitamin D Deficiency   Mixed Hyperlipidemia   Erythrocytosis   Abdominal Pain     ROS:  Review of Systems   Gastrointestinal:  Positive for abdominal pain and diarrhea.   Psychiatric/Behavioral:  The patient is nervous/anxious.    All other systems reviewed and are negative.      Objective:     Exam:  /80 (BP Location: Left arm, Patient Position: Sitting, BP Cuff Size: Adult)   Pulse 65   Temp 36.4 °C (97.6 °F) (Temporal)   Resp 18   Ht 1.524 m (5')   Wt 55.3 kg (122 lb)   SpO2 94%   BMI 23.83 kg/m²  Body mass index is 23.83 kg/m².    Physical Exam  Vitals reviewed.   Constitutional:       General: She is not in acute distress.     Appearance: Normal appearance. She is not ill-appearing.   HENT:      Head: Normocephalic and atraumatic.   Cardiovascular:      Rate and Rhythm: Normal rate.      Pulses: Normal pulses.   Pulmonary:      Effort: Pulmonary effort is normal. No respiratory distress.   Skin:     General: Skin is warm and dry.      Findings: No rash.   Neurological:      General: No focal deficit present.      Mental Status: She is alert and oriented to person, place, and time.   Psychiatric:         Mood and Affect: Mood normal.         Behavior: Behavior normal.         Labs:    Latest Reference Range & Units 02/22/24 07:20   WBC 4.8 - 10.8 K/uL 6.5   RBC 4.20 - 5.40 M/uL 5.86 (H)   Hemoglobin 12.0 - 16.0 g/dL 16.9 (H)   Hematocrit 37.0 - 47.0 % 52.8 (H)   MCV 81.4 - 97.8 fL 90.1   MCH 27.0 - 33.0 pg 28.8   MCHC 32.2 - 35.5 g/dL 32.0 (L)   RDW 35.9 - 50.0 fL 42.3   Platelet Count 164 - 446 K/uL 293   MPV 9.0 - 12.9 fL 11.8   Sodium 135 - 145 mmol/L 142   Potassium 3.6 - 5.5 mmol/L 4.3   Chloride 96 - 112 mmol/L 106   Co2 20 - 33 mmol/L 24   Anion Gap 7.0 - 16.0  12.0   Glucose 65 - 99 mg/dL 98   Bun 8  - 22 mg/dL 9   Creatinine 0.50 - 1.40 mg/dL 0.56   GFR (CKD-EPI) >60 mL/min/1.73 m 2 108   Calcium 8.5 - 10.5 mg/dL 9.3   Correct Calcium 8.5 - 10.5 mg/dL 8.9   AST(SGOT) 12 - 45 U/L 18   ALT(SGPT) 2 - 50 U/L 23   Alkaline Phosphatase 30 - 99 U/L 77   Total Bilirubin 0.1 - 1.5 mg/dL 1.2   Albumin 3.2 - 4.9 g/dL 4.5   Total Protein 6.0 - 8.2 g/dL 7.1   Globulin 1.9 - 3.5 g/dL 2.6   A-G Ratio g/dL 1.7   Fasting Status  Fasting   Cholesterol,Tot 100 - 199 mg/dL 250 (H)   Triglycerides 0 - 149 mg/dL 179 (H)   HDL >=40 mg/dL 53   LDL <100 mg/dL 161 (H)   25-Hydroxy   Vitamin D 25 30 - 100 ng/mL 10 (L)   TSH 0.380 - 5.330 uIU/mL 1.440   Hepatitis C Antibody Non-Reactive  Non-Reactive   HIV Ag/Ab Combo Assay Non Reactive  Non-Reactive   (H): Data is abnormally high  (L): Data is abnormally low    Assessment & Plan:     53 y.o. female with the following -     Problem List Items Addressed This Visit          Family Medicine Problems    Vitamin D deficiency     Chronic, unstable. Will provide ergocalciferol 69065 iu once weekly for 12 weeks, followed by once daily vit d3 otc paired with calcium to optimize absorption.           Relevant Medications    ergocalciferol (DRISDOL) 51891 UNIT capsule    Other Relevant Orders    VITAMIN D,25 HYDROXY (DEFICIENCY)       Other    Abdominal pain     Chronic, ongoing. Continues to experience abdominal pain, 10/10 in the morning daily with burning cramping pain to epigastric area. Pain lessens over the course of the day but is never a 0/10. Also reports diarrhea, early satiety, reduced appetite, pain occasionally waking her up, and interrupting sleep  Has found some relief with herbal tea in the morning to reduce intensity of pain.   Has modified diet to reduce acid/spicy foods  Has experienced increased life stressors    Hpylori- treat if positive  After completed, recommend starting omeprazole daily on an empty stomach and famotidine nightly  Consider CT scan of abdomen if no  resolution             Relevant Medications    omeprazole (PRILOSEC) 20 MG delayed-release capsule    famotidine (PEPCID) 20 MG Tab    Other Relevant Orders    H. PYLORI BREATH TEST    Mixed hyperlipidemia     Chronic, unstable. Discussed healthy lifestyle recommendations.   Plan to recheck lipid panel 6 months, consider ct cardiac score  The 10-year ASCVD risk score (Lauren TANNER, et al., 2019) is: 5.4%           Relevant Orders    Lipid Profile    Erythrocytosis     Plan to repeat cbc with diff epo/jak2 for further evaluation 6 months.         Relevant Orders    CBC WITH DIFFERENTIAL    ERYTHROPOIETIN    JAK2 GENE MUT RFLX CALR RFLX MPL     Patient was educated in proper administration of medication(s) ordered today including safety, possible SE, risks, benefits, rationale and alternatives to therapy.   Supportive care, differential diagnoses, and indications for immediate follow-up discussed with patient.    Pathogenesis of diagnosis discussed including typical length and natural progression.    Instructed to return to clinic or nearest emergency department for any change in condition, further concerns, or worsening of symptoms.  Patient states understanding of the plan of care and discharge instructions.    Return in about 6 months (around 8/28/2024), or if symptoms worsen or fail to improve, for Labs.    Please note that this dictation was created using voice recognition software. I have made every reasonable attempt to correct obvious errors, but I expect that there are errors of grammar and possibly content that I did not discover before finalizing the note.

## 2024-03-18 DIAGNOSIS — R10.13 EPIGASTRIC PAIN: ICD-10-CM

## 2024-03-18 DIAGNOSIS — K92.1 BLACK STOOL: ICD-10-CM

## 2024-03-18 DIAGNOSIS — K21.9 GASTROESOPHAGEAL REFLUX DISEASE WITHOUT ESOPHAGITIS: ICD-10-CM

## 2024-03-18 RX ORDER — SUCRALFATE 1 G/1
1 TABLET ORAL
Qty: 120 TABLET | Refills: 3 | Status: SHIPPED | OUTPATIENT
Start: 2024-03-18

## 2024-03-26 ENCOUNTER — HOSPITAL ENCOUNTER (OUTPATIENT)
Dept: RADIOLOGY | Facility: MEDICAL CENTER | Age: 54
End: 2024-03-26
Payer: OTHER GOVERNMENT

## 2024-04-18 ENCOUNTER — HOSPITAL ENCOUNTER (OUTPATIENT)
Dept: RADIOLOGY | Facility: MEDICAL CENTER | Age: 54
End: 2024-04-18
Payer: OTHER GOVERNMENT

## 2024-04-18 DIAGNOSIS — K21.9 GASTROESOPHAGEAL REFLUX DISEASE WITHOUT ESOPHAGITIS: ICD-10-CM

## 2024-04-18 DIAGNOSIS — R10.13 EPIGASTRIC PAIN: ICD-10-CM

## 2024-04-18 DIAGNOSIS — K92.1 BLACK STOOL: ICD-10-CM

## 2024-04-18 PROCEDURE — 74176 CT ABD & PELVIS W/O CONTRAST: CPT

## 2024-04-23 DIAGNOSIS — R10.13 EPIGASTRIC PAIN: ICD-10-CM

## 2024-04-23 DIAGNOSIS — R19.7 DIARRHEA, UNSPECIFIED TYPE: ICD-10-CM

## 2024-04-23 RX ORDER — DICYCLOMINE HYDROCHLORIDE 10 MG/1
10 CAPSULE ORAL
Qty: 120 CAPSULE | Refills: 1 | Status: SHIPPED | OUTPATIENT
Start: 2024-04-23

## 2024-06-20 ENCOUNTER — PATIENT MESSAGE (OUTPATIENT)
Dept: MEDICAL GROUP | Facility: PHYSICIAN GROUP | Age: 54
End: 2024-06-20
Payer: OTHER GOVERNMENT

## 2024-06-20 DIAGNOSIS — F41.9 ANXIETY: ICD-10-CM

## 2024-06-20 DIAGNOSIS — F33.1 MODERATE EPISODE OF RECURRENT MAJOR DEPRESSIVE DISORDER (HCC): ICD-10-CM

## 2024-09-19 ENCOUNTER — APPOINTMENT (OUTPATIENT)
Dept: TELEHEALTH | Facility: TELEMEDICINE | Age: 54
End: 2024-09-19
Payer: OTHER GOVERNMENT

## 2024-09-19 ENCOUNTER — OFFICE VISIT (OUTPATIENT)
Dept: URGENT CARE | Facility: PHYSICIAN GROUP | Age: 54
End: 2024-09-19
Payer: OTHER GOVERNMENT

## 2024-09-19 VITALS
DIASTOLIC BLOOD PRESSURE: 64 MMHG | OXYGEN SATURATION: 99 % | RESPIRATION RATE: 16 BRPM | WEIGHT: 128 LBS | SYSTOLIC BLOOD PRESSURE: 110 MMHG | BODY MASS INDEX: 25.13 KG/M2 | TEMPERATURE: 98.5 F | HEART RATE: 66 BPM | HEIGHT: 60 IN

## 2024-09-19 DIAGNOSIS — K52.9 GASTROENTERITIS: ICD-10-CM

## 2024-09-19 PROCEDURE — 3078F DIAST BP <80 MM HG: CPT | Performed by: STUDENT IN AN ORGANIZED HEALTH CARE EDUCATION/TRAINING PROGRAM

## 2024-09-19 PROCEDURE — 3074F SYST BP LT 130 MM HG: CPT | Performed by: STUDENT IN AN ORGANIZED HEALTH CARE EDUCATION/TRAINING PROGRAM

## 2024-09-19 PROCEDURE — 99213 OFFICE O/P EST LOW 20 MIN: CPT | Performed by: STUDENT IN AN ORGANIZED HEALTH CARE EDUCATION/TRAINING PROGRAM

## 2024-09-19 RX ORDER — PANTOPRAZOLE SODIUM 40 MG/1
TABLET, DELAYED RELEASE ORAL
COMMUNITY
Start: 2024-06-26

## 2024-09-19 ASSESSMENT — FIBROSIS 4 INDEX: FIB4 SCORE: 0.7

## 2024-09-19 NOTE — PROGRESS NOTES
Subjective:   CHIEF COMPLAINT  Chief Complaint   Patient presents with    Fatigue     Diarrhea,chillsx 4 days        HPI  Елена Fowler is a 53 y.o. female who presents with a chief complaint of bodyaches, chills, hot sweats and diarrhea x 3 days.  She has had 1 bout of loose stools this morning.  She has tried Mucinex which has not helped.  She is not experiencing any abdominal pain, dysuria or hematuria.  No melena or hematochezia.  No history of diverticulitis and is up-to-date with her colonoscopies.  She is tolerating p.o. intake without any nausea or vomiting.  Positive ROS for bilateral earache.  No cough or sore throat.  No known sick contacts.  COVID by her  who is feeling fine.    REVIEW OF SYSTEMS  General: no fever or chills  GI: no nausea or vomiting  See HPI for further details.    PAST MEDICAL HISTORY  Patient Active Problem List    Diagnosis Date Noted    Vitamin D deficiency 02/28/2024    Mixed hyperlipidemia 02/28/2024    Erythrocytosis 02/28/2024    Sinusitis 12/11/2023    Mass of breast 12/11/2023    Lower back pain 12/11/2023    Insomnia 12/11/2023    Depression 12/11/2023    Carpal tunnel syndrome 12/11/2023    Bunion 12/11/2023    Abdominal pain 12/11/2023    HSV (herpes simplex virus) anogenital infection 12/11/2023    Anxiety 12/11/2023    Major depressive disorder 12/11/2023    Black stool 12/11/2023    Gastroesophageal reflux disease without esophagitis 12/11/2023    Weight loss 12/11/2023       SURGICAL HISTORY   has a past surgical history that includes abdominal hysterectomy total; eye surgery; and tubal coagulation laparoscopic bilateral.    ALLERGIES  Allergies   Allergen Reactions    Benadryl Allergy      Other Reaction(s): Hyperactivity    Penicillins Anaphylaxis and Unspecified     Was told as a baby    Benadryl [Diphenhydramine]        CURRENT MEDICATIONS  Home Medications       Reviewed by Jakob Felder Ass't (Medical Assistant) on 09/19/24 at 1308   Med List Status: <None>     Medication Last Dose Status   dicyclomine (BENTYL) 10 MG Cap Not Taking Active   ergocalciferol (DRISDOL) 98073 UNIT capsule Not Taking Active   famotidine (PEPCID) 20 MG Tab Not Taking Active   omeprazole (PRILOSEC) 20 MG delayed-release capsule Not Taking Active   pantoprazole (PROTONIX) 40 MG Tablet Delayed Response Not Taking Active   sucralfate (CARAFATE) 1 GM Tab Not Taking Active   valACYclovir (VALTREX) 500 MG Tab Not Taking Active                    SOCIAL HISTORY  Social History     Tobacco Use    Smoking status: Every Day     Types: Electronic Cigarettes    Smokeless tobacco: Never   Vaping Use    Vaping status: Never Used   Substance and Sexual Activity    Alcohol use: Yes     Alcohol/week: 0.6 oz     Types: 1 Glasses of wine per week    Drug use: Yes     Frequency: 7.0 times per week     Types: Inhaled, Marijuana    Sexual activity: Not Currently     Partners: Female, Male       FAMILY HISTORY  Family History   Problem Relation Age of Onset    Hypertension Father     Breast Cancer Maternal Aunt     Cancer Maternal Aunt     Stroke Maternal Aunt     Breast Cancer Maternal Aunt     Heart Disease Paternal Grandmother     Heart Disease Paternal Grandfather     Ovarian Cancer Neg Hx     Peritoneal Cancer Neg Hx     Tubal Cancer Neg Hx     Colorectal Cancer Neg Hx     Diabetes Neg Hx     Hyperlipidemia Neg Hx           Objective:   PHYSICAL EXAM  VITAL SIGNS: /64 (BP Location: Right arm, Patient Position: Sitting, BP Cuff Size: Adult)   Pulse 66   Temp 36.9 °C (98.5 °F) (Temporal)   Resp 16   Ht 1.524 m (5')   Wt 58.1 kg (128 lb)   SpO2 99%   BMI 25.00 kg/m²     Gen: no acute distress, normal voice  Skin: dry, intact, moist mucosal membranes  Eyes: No conjunctival injection bilaterally.  Neck: Normal range of motion. No meningeal signs.   ENT: No oropharyngeal erythema or exudates. Uvula midline. TMs clear and intact b/l w/o bulging, erythema or effusion.  Bilateral  tympanosclerosis.  No lymphadenopathy.  Lungs: No increased work of breathing.  CTAB w/ symmetric expansion  CV: RRR w/o murmurs or clicks  Abdomen: Bowel sounds normal, soft, no distention. No tenderness, rigidity or involuntary guarding.    Psych: normal affect, normal judgement, alert, awake    Assessment/Plan:     1. Gastroenteritis        Suspect underlying viral etiology.  She is tolerating p.o. intake without any nausea or vomiting.  Abdominal exam is reassuring without any red flags.  Recommended a trial of Metamucil and probiotics such as Activia yogurt.  Discussed red flags and return precautions.  Patient and  understood everything discussed and all questions were answered.          Please note that this dictation was created using voice recognition software. I have made a reasonable attempt to correct obvious errors, but I expect that there are errors of grammar and possibly content that I did not discover before finalizing the note.

## 2025-03-02 DIAGNOSIS — A60.9 HSV (HERPES SIMPLEX VIRUS) ANOGENITAL INFECTION: ICD-10-CM

## 2025-03-03 RX ORDER — VALACYCLOVIR HYDROCHLORIDE 500 MG/1
500 TABLET, FILM COATED ORAL 2 TIMES DAILY
Qty: 40 TABLET | Refills: 0 | Status: SHIPPED | OUTPATIENT
Start: 2025-03-03 | End: 2025-03-11 | Stop reason: SDUPTHER

## 2025-03-03 NOTE — TELEPHONE ENCOUNTER
Received request via: Pharmacy    Was the patient seen in the last year in this department? No    Does the patient have an active prescription (recently filled or refills available) for medication(s) requested? No    Pharmacy Name:   [facility]/pharmacy #[id number] - [place] - [address]  [address] [address]  Phone: [phone] Fax: [phone]       Does the patient have [facility] Plus and need 100-day supply? (This applies to ALL medications) Patient does not have SCP

## 2025-03-11 ENCOUNTER — PATIENT MESSAGE (OUTPATIENT)
Dept: MEDICAL GROUP | Facility: PHYSICIAN GROUP | Age: 55
End: 2025-03-11
Payer: OTHER GOVERNMENT

## 2025-03-11 DIAGNOSIS — A60.9 HSV (HERPES SIMPLEX VIRUS) ANOGENITAL INFECTION: ICD-10-CM

## 2025-03-11 NOTE — PATIENT COMMUNICATION
Received request via: Pharmacy    Was the patient seen in the last year in this department? No    Does the patient have an active prescription (recently filled or refills available) for medication(s) requested? No    Pharmacy Name:   [facility]/pharmacy #[id number] - [place] - [address]  [address] [address]  Phone: [phone] Fax: [phone]       Does the patient have skilled nursing Plus and need 100-day supply? (This applies to ALL medications) Patient does not have SCP

## 2025-03-18 RX ORDER — VALACYCLOVIR HYDROCHLORIDE 500 MG/1
500 TABLET, FILM COATED ORAL 2 TIMES DAILY
Qty: 40 TABLET | Refills: 0 | Status: SHIPPED | OUTPATIENT
Start: 2025-03-18

## 2025-03-30 ENCOUNTER — HOSPITAL ENCOUNTER (OUTPATIENT)
Facility: MEDICAL CENTER | Age: 55
End: 2025-03-30
Attending: STUDENT IN AN ORGANIZED HEALTH CARE EDUCATION/TRAINING PROGRAM
Payer: OTHER GOVERNMENT

## 2025-03-30 ENCOUNTER — OFFICE VISIT (OUTPATIENT)
Dept: URGENT CARE | Facility: PHYSICIAN GROUP | Age: 55
End: 2025-03-30
Payer: OTHER GOVERNMENT

## 2025-03-30 VITALS
OXYGEN SATURATION: 93 % | TEMPERATURE: 98.7 F | HEART RATE: 77 BPM | WEIGHT: 131.7 LBS | BODY MASS INDEX: 25.86 KG/M2 | HEIGHT: 60 IN | RESPIRATION RATE: 16 BRPM | SYSTOLIC BLOOD PRESSURE: 110 MMHG | DIASTOLIC BLOOD PRESSURE: 86 MMHG

## 2025-03-30 DIAGNOSIS — R39.9 UTI SYMPTOMS: ICD-10-CM

## 2025-03-30 LAB
APPEARANCE UR: NORMAL
BILIRUB UR STRIP-MCNC: NORMAL MG/DL
COLOR UR AUTO: NORMAL
GLUCOSE UR STRIP.AUTO-MCNC: 100 MG/DL
KETONES UR STRIP.AUTO-MCNC: 15 MG/DL
LEUKOCYTE ESTERASE UR QL STRIP.AUTO: NORMAL
NITRITE UR QL STRIP.AUTO: NORMAL
PH UR STRIP.AUTO: 6 [PH] (ref 5–8)
PROT UR QL STRIP: 100 MG/DL
RBC UR QL AUTO: NORMAL
SP GR UR STRIP.AUTO: 1.01
UROBILINOGEN UR STRIP-MCNC: 4 MG/DL

## 2025-03-30 PROCEDURE — 87077 CULTURE AEROBIC IDENTIFY: CPT

## 2025-03-30 PROCEDURE — 87186 SC STD MICRODIL/AGAR DIL: CPT

## 2025-03-30 PROCEDURE — 87086 URINE CULTURE/COLONY COUNT: CPT

## 2025-03-30 RX ORDER — NITROFURANTOIN 25; 75 MG/1; MG/1
100 CAPSULE ORAL 2 TIMES DAILY
Qty: 10 CAPSULE | Refills: 0 | Status: SHIPPED | OUTPATIENT
Start: 2025-03-30 | End: 2025-04-04

## 2025-03-30 ASSESSMENT — ENCOUNTER SYMPTOMS
HEADACHES: 1
FEVER: 0
FLANK PAIN: 0
DIZZINESS: 0
CHILLS: 0

## 2025-03-30 ASSESSMENT — FIBROSIS 4 INDEX: FIB4 SCORE: 0.72

## 2025-03-30 NOTE — PROGRESS NOTES
Subjective     Елена Fowler is a 54 y.o. female who presents with UTI (Bladder pressure, headache, urgency, onset yesterday )            Елена is a 54 y.o. female who presents to urgent care with concerns for UTI.  Reports lower bladder pressure as well as urinary urgency and frequency.  Patient took Azo last night and this morning.  No fever/chills.  No abdominal pain or flank pain.        Review of Systems   Constitutional:  Negative for chills and fever.   Genitourinary:  Positive for frequency and urgency. Negative for dysuria, flank pain and hematuria.   Neurological:  Positive for headaches. Negative for dizziness.   All other systems reviewed and are negative.             Objective     /86 (BP Location: Left arm, Patient Position: Sitting, BP Cuff Size: Adult)   Pulse 77   Temp 37.1 °C (98.7 °F) (Temporal)   Resp 16   Ht 1.524 m (5')   Wt 59.7 kg (131 lb 11.2 oz)   SpO2 93%   BMI 25.72 kg/m²      Physical Exam  Vitals reviewed.   Constitutional:       General: She is not in acute distress.     Appearance: Normal appearance. She is not ill-appearing, toxic-appearing or diaphoretic.   HENT:      Head: Normocephalic and atraumatic.      Nose: Nose normal.   Eyes:      Extraocular Movements: Extraocular movements intact.      Conjunctiva/sclera: Conjunctivae normal.      Pupils: Pupils are equal, round, and reactive to light.   Cardiovascular:      Rate and Rhythm: Normal rate.   Pulmonary:      Effort: Pulmonary effort is normal.   Abdominal:      General: Abdomen is flat. There is no distension.      Palpations: Abdomen is soft.      Tenderness: There is no abdominal tenderness. There is no right CVA tenderness, left CVA tenderness, guarding or rebound.   Skin:     General: Skin is warm and dry.   Neurological:      General: No focal deficit present.      Mental Status: She is alert and oriented to person, place, and time.                                  Assessment & Plan  UTI  symptoms  - Patient took AZO this morning. Urine sent out for urine culture.    Orders:    POCT Urinalysis    URINE CULTURE(NEW); Future    nitrofurantoin (MACROBID) 100 MG Cap; Take 1 Capsule by mouth 2 times a day for 5 days.         Differential diagnoses, supportive care measures and indications for immediate follow-up discussed with patient. Pathogenesis of diagnosis discussed including typical length and natural progression.      Instructed to return to urgent care or nearest emergency department if symptoms fail to improve, for any change in condition, further concerns, or new concerning symptoms.    Patient states understanding and agrees with the plan of care and discharge instructions.

## 2025-03-31 DIAGNOSIS — R39.9 UTI SYMPTOMS: ICD-10-CM

## 2025-04-02 ENCOUNTER — RESULTS FOLLOW-UP (OUTPATIENT)
Dept: URGENT CARE | Facility: PHYSICIAN GROUP | Age: 55
End: 2025-04-02

## 2025-04-02 LAB
BACTERIA UR CULT: ABNORMAL
BACTERIA UR CULT: ABNORMAL
SIGNIFICANT IND 70042: ABNORMAL
SITE SITE: ABNORMAL
SOURCE SOURCE: ABNORMAL

## 2025-04-06 ENCOUNTER — HOSPITAL ENCOUNTER (OUTPATIENT)
Facility: MEDICAL CENTER | Age: 55
End: 2025-04-06
Payer: OTHER GOVERNMENT

## 2025-04-06 ENCOUNTER — OFFICE VISIT (OUTPATIENT)
Dept: URGENT CARE | Facility: PHYSICIAN GROUP | Age: 55
End: 2025-04-06
Payer: OTHER GOVERNMENT

## 2025-04-06 VITALS
SYSTOLIC BLOOD PRESSURE: 106 MMHG | BODY MASS INDEX: 25.52 KG/M2 | OXYGEN SATURATION: 98 % | HEIGHT: 60 IN | RESPIRATION RATE: 20 BRPM | DIASTOLIC BLOOD PRESSURE: 70 MMHG | WEIGHT: 130 LBS | TEMPERATURE: 97.4 F | HEART RATE: 62 BPM

## 2025-04-06 DIAGNOSIS — R30.0 DYSURIA: ICD-10-CM

## 2025-04-06 DIAGNOSIS — R10.2 PELVIC PAIN: ICD-10-CM

## 2025-04-06 DIAGNOSIS — B96.89 BV (BACTERIAL VAGINOSIS): ICD-10-CM

## 2025-04-06 DIAGNOSIS — N76.0 BV (BACTERIAL VAGINOSIS): ICD-10-CM

## 2025-04-06 LAB
APPEARANCE UR: CLEAR
BILIRUB UR STRIP-MCNC: NEGATIVE MG/DL
COLOR UR AUTO: YELLOW
GLUCOSE UR STRIP.AUTO-MCNC: NEGATIVE MG/DL
KETONES UR STRIP.AUTO-MCNC: NEGATIVE MG/DL
LEUKOCYTE ESTERASE UR QL STRIP.AUTO: NEGATIVE
NITRITE UR QL STRIP.AUTO: NEGATIVE
PH UR STRIP.AUTO: 6 [PH] (ref 5–8)
PROT UR QL STRIP: NEGATIVE MG/DL
RBC UR QL AUTO: NEGATIVE
SP GR UR STRIP.AUTO: 1.02
UROBILINOGEN UR STRIP-MCNC: 0.2 MG/DL

## 2025-04-06 PROCEDURE — 3078F DIAST BP <80 MM HG: CPT

## 2025-04-06 PROCEDURE — 81002 URINALYSIS NONAUTO W/O SCOPE: CPT

## 2025-04-06 PROCEDURE — 87480 CANDIDA DNA DIR PROBE: CPT

## 2025-04-06 PROCEDURE — 87660 TRICHOMONAS VAGIN DIR PROBE: CPT

## 2025-04-06 PROCEDURE — 99214 OFFICE O/P EST MOD 30 MIN: CPT

## 2025-04-06 PROCEDURE — 3074F SYST BP LT 130 MM HG: CPT

## 2025-04-06 PROCEDURE — 87086 URINE CULTURE/COLONY COUNT: CPT

## 2025-04-06 PROCEDURE — 87510 GARDNER VAG DNA DIR PROBE: CPT

## 2025-04-06 RX ORDER — METRONIDAZOLE 7.5 MG/G
1 GEL VAGINAL
Qty: 5 EACH | Refills: 0 | Status: SHIPPED | OUTPATIENT
Start: 2025-04-06 | End: 2025-04-11

## 2025-04-06 ASSESSMENT — FIBROSIS 4 INDEX: FIB4 SCORE: 0.72

## 2025-04-06 ASSESSMENT — ENCOUNTER SYMPTOMS
FLANK PAIN: 0
BACK PAIN: 0
FEVER: 0
CHILLS: 0

## 2025-04-06 NOTE — PROGRESS NOTES
CHIEF COMPLAINT  Chief Complaint   Patient presents with   • UTI     Painful urination,x1 week     Subjective:   Елена Fowler is a 54 y.o. female who presents to urgent care with concerns for urinary tract infection.  Patient reports symptoms of mild painful urination as well as lower pelvic pain x 1 week.  She  reports she was previously seen in urgent care last week for similar symptoms, and was placed on a course of Macrobid.  She denies any symptom resolution on course of antibiotic.  She denies any fevers or chills.  Denies any abnormal vaginal discharge or irritation.  No flank pain.  No other pertinent history.     Review of Systems   Constitutional:  Negative for chills and fever.   Genitourinary:  Positive for dysuria. Negative for flank pain.   Musculoskeletal:  Negative for back pain.       PAST MEDICAL HISTORY  Patient Active Problem List    Diagnosis Date Noted   • Vitamin D deficiency 02/28/2024   • Mixed hyperlipidemia 02/28/2024   • Erythrocytosis 02/28/2024   • Sinusitis 12/11/2023   • Mass of breast 12/11/2023   • Lower back pain 12/11/2023   • Insomnia 12/11/2023   • Depression 12/11/2023   • Carpal tunnel syndrome 12/11/2023   • Bunion 12/11/2023   • Abdominal pain 12/11/2023   • HSV (herpes simplex virus) anogenital infection 12/11/2023   • Anxiety 12/11/2023   • Major depressive disorder 12/11/2023   • Black stool 12/11/2023   • Gastroesophageal reflux disease without esophagitis 12/11/2023   • Weight loss 12/11/2023       SURGICAL HISTORY   has a past surgical history that includes abdominal hysterectomy total; eye surgery; and tubal coagulation laparoscopic bilateral.    ALLERGIES  Allergies   Allergen Reactions   • Benadryl Allergy      Other Reaction(s): Hyperactivity   • Penicillins Anaphylaxis and Unspecified     Was told as a baby   • Benadryl [Diphenhydramine]        CURRENT MEDICATIONS  Home Medications       Reviewed by Jakob Salas Ass't (Medical Assistant) on  04/06/25 at 1434  Med List Status: <None>     Medication Last Dose Status   dicyclomine (BENTYL) 10 MG Cap  Active   ergocalciferol (DRISDOL) 27188 UNIT capsule  Active   famotidine (PEPCID) 20 MG Tab  Active   omeprazole (PRILOSEC) 20 MG delayed-release capsule  Active   pantoprazole (PROTONIX) 40 MG Tablet Delayed Response  Active   sucralfate (CARAFATE) 1 GM Tab  Active   valACYclovir (VALTREX) 500 MG Tab Not Taking Active                    SOCIAL HISTORY  Social History     Tobacco Use   • Smoking status: Every Day     Types: Electronic Cigarettes   • Smokeless tobacco: Never   Vaping Use   • Vaping status: Never Used   Substance and Sexual Activity   • Alcohol use: Yes     Alcohol/week: 0.6 oz     Types: 1 Glasses of wine per week     Comment: occ   • Drug use: Yes     Frequency: 7.0 times per week     Types: Inhaled, Marijuana   • Sexual activity: Not Currently     Partners: Female, Male       FAMILY HISTORY  Family History   Problem Relation Age of Onset   • Hypertension Father    • Breast Cancer Maternal Aunt    • Cancer Maternal Aunt    • Stroke Maternal Aunt    • Breast Cancer Maternal Aunt    • Heart Disease Paternal Grandmother    • Heart Disease Paternal Grandfather    • Ovarian Cancer Neg Hx    • Peritoneal Cancer Neg Hx    • Tubal Cancer Neg Hx    • Colorectal Cancer Neg Hx    • Diabetes Neg Hx    • Hyperlipidemia Neg Hx          Medications, Allergies, and current problem list reviewed today in Epic.     Objective:     /70   Pulse 62   Temp 36.3 °C (97.4 °F) (Temporal)   Resp 20   Ht 1.524 m (5')   Wt 59 kg (130 lb)   SpO2 98%     Physical Exam  Vitals reviewed.   Constitutional:       General: She is not in acute distress.     Appearance: Normal appearance. She is not ill-appearing or toxic-appearing.   HENT:      Head: Normocephalic.      Mouth/Throat:      Mouth: Mucous membranes are moist.      Pharynx: Oropharynx is clear.   Cardiovascular:      Rate and Rhythm: Normal rate.       Pulses: Normal pulses.   Pulmonary:      Effort: Pulmonary effort is normal.   Abdominal:      General: Abdomen is flat.      Palpations: Abdomen is soft.   Neurological:      General: No focal deficit present.      Mental Status: She is alert.   Psychiatric:         Mood and Affect: Mood normal.       Lab Results/POC Test Results   Results for orders placed or performed during the hospital encounter of 03/30/25   URINE CULTURE(NEW)    Collection Time: 03/30/25  2:17 PM    Specimen: Urine   Result Value Ref Range    Significant Indicator POS (POS)     Source UR     Site -     Culture Result - (A)     Culture Result Enterococcus faecalis  10-50,000 cfu/mL   (A)        Susceptibility    Enterococcus faecalis - RASHEED     Ampicillin <=2 Sensitive mcg/mL     Daptomycin 4 Sensitive mcg/mL     Nitrofurantoin <=32 Sensitive mcg/mL     Tetracycline <=4 Sensitive mcg/mL     Vancomycin 1 Sensitive mcg/mL           Assessment/Plan:     Diagnosis and associated orders:     1. Pelvic pain  URINE CULTURE(NEW)    VAGINAL PATHOGENS DNA PANEL      2. BV (bacterial vaginosis)  metroNIDAZOLE (METROGEL-VAGINAL) 0.75 % Gel      3. Dysuria  URINE CULTURE(NEW)    POCT Urinalysis         Comments/MDM:     Previous culture collected shows Enterococcus faecalis (10,000-50,000 cfu), susceptibilities do show sensitivity to Macrobid.  Patient declines any symptomatic improvement on antibiotic.  POC urine completed in clinic unremarkable.  Will culture for further evaluation.  Given POC urine as well as reports of symptoms I do have suspicion for BV.  Will send for vaginal pathogen swab.  Metronidazole gel sent to preferred pharmacy.  Patient counseled on appropriate administration of medication.  Strict return and ER precautions discussed.  Patient feels comfortable plan.         Differential diagnosis, natural history, supportive care, and indications for immediate follow-up discussed.    Advised the patient to follow-up with the primary care  physician for recheck, reevaluation, and consideration of further management.    Please note that this dictation was created using voice recognition software. I have made a reasonable attempt to correct obvious errors, but I expect that there are errors of grammar and possibly content that I did not discover before finalizing the note.    This note was electronically signed by FLOYD Walden

## 2025-04-07 ENCOUNTER — RESULTS FOLLOW-UP (OUTPATIENT)
Dept: URGENT CARE | Facility: PHYSICIAN GROUP | Age: 55
End: 2025-04-07

## 2025-04-07 LAB
CANDIDA DNA VAG QL PROBE+SIG AMP: NEGATIVE
G VAGINALIS DNA VAG QL PROBE+SIG AMP: NEGATIVE
T VAGINALIS DNA VAG QL PROBE+SIG AMP: NEGATIVE

## 2025-04-09 ENCOUNTER — TELEPHONE (OUTPATIENT)
Dept: URGENT CARE | Facility: CLINIC | Age: 55
End: 2025-04-09
Payer: OTHER GOVERNMENT

## 2025-04-09 ENCOUNTER — TELEPHONE (OUTPATIENT)
Dept: URGENT CARE | Facility: PHYSICIAN GROUP | Age: 55
End: 2025-04-09
Payer: OTHER GOVERNMENT

## 2025-04-09 LAB
BACTERIA UR CULT: NORMAL
SIGNIFICANT IND 70042: NORMAL
SITE SITE: NORMAL
SOURCE SOURCE: NORMAL

## 2025-04-10 NOTE — PROGRESS NOTES
Patient called to discuss vaginal pathogen results. Called in Fosfomycin 3g PO once, pending urine cultures.

## 2025-04-10 NOTE — TELEPHONE ENCOUNTER
Hello, I spoke with this patient on the phone regarding results on 3/7. I will enter that conversation in [facility].

## 2025-04-11 ENCOUNTER — APPOINTMENT (OUTPATIENT)
Dept: RADIOLOGY | Facility: MEDICAL CENTER | Age: 55
End: 2025-04-11
Attending: EMERGENCY MEDICINE
Payer: OTHER GOVERNMENT

## 2025-04-11 ENCOUNTER — OFFICE VISIT (OUTPATIENT)
Dept: URGENT CARE | Facility: PHYSICIAN GROUP | Age: 55
End: 2025-04-11
Payer: OTHER GOVERNMENT

## 2025-04-11 ENCOUNTER — HOSPITAL ENCOUNTER (EMERGENCY)
Facility: MEDICAL CENTER | Age: 55
End: 2025-04-12
Payer: OTHER GOVERNMENT

## 2025-04-11 ENCOUNTER — HOSPITAL ENCOUNTER (EMERGENCY)
Facility: MEDICAL CENTER | Age: 55
End: 2025-04-11
Attending: EMERGENCY MEDICINE
Payer: OTHER GOVERNMENT

## 2025-04-11 VITALS
HEART RATE: 59 BPM | DIASTOLIC BLOOD PRESSURE: 60 MMHG | BODY MASS INDEX: 25.68 KG/M2 | RESPIRATION RATE: 14 BRPM | SYSTOLIC BLOOD PRESSURE: 102 MMHG | WEIGHT: 130.8 LBS | TEMPERATURE: 98.2 F | OXYGEN SATURATION: 97 % | HEIGHT: 60 IN

## 2025-04-11 VITALS
WEIGHT: 130.73 LBS | BODY MASS INDEX: 25.67 KG/M2 | SYSTOLIC BLOOD PRESSURE: 141 MMHG | HEIGHT: 60 IN | HEART RATE: 54 BPM | RESPIRATION RATE: 18 BRPM | DIASTOLIC BLOOD PRESSURE: 70 MMHG | TEMPERATURE: 97.4 F | OXYGEN SATURATION: 98 %

## 2025-04-11 DIAGNOSIS — R10.33 PERIUMBILICAL ABDOMINAL PAIN: ICD-10-CM

## 2025-04-11 DIAGNOSIS — R30.0 DYSURIA: ICD-10-CM

## 2025-04-11 LAB
ALBUMIN SERPL BCP-MCNC: 4.7 G/DL (ref 3.2–4.9)
ALBUMIN/GLOB SERPL: 1.6 G/DL
ALP SERPL-CCNC: 81 U/L (ref 30–99)
ALT SERPL-CCNC: 38 U/L (ref 2–50)
ANION GAP SERPL CALC-SCNC: 11 MMOL/L (ref 7–16)
APPEARANCE UR: CLEAR
APPEARANCE UR: CLEAR
AST SERPL-CCNC: 23 U/L (ref 12–45)
BASOPHILS # BLD AUTO: 0.7 % (ref 0–1.8)
BASOPHILS # BLD: 0.05 K/UL (ref 0–0.12)
BILIRUB SERPL-MCNC: 0.8 MG/DL (ref 0.1–1.5)
BILIRUB UR QL STRIP.AUTO: NEGATIVE
BILIRUB UR STRIP-MCNC: NEGATIVE MG/DL
BUN SERPL-MCNC: 11 MG/DL (ref 8–22)
CALCIUM ALBUM COR SERPL-MCNC: 9.1 MG/DL (ref 8.5–10.5)
CALCIUM SERPL-MCNC: 9.7 MG/DL (ref 8.5–10.5)
CHLORIDE SERPL-SCNC: 104 MMOL/L (ref 96–112)
CO2 SERPL-SCNC: 24 MMOL/L (ref 20–33)
COLOR UR AUTO: YELLOW
COLOR UR: YELLOW
CREAT SERPL-MCNC: 0.62 MG/DL (ref 0.5–1.4)
EOSINOPHIL # BLD AUTO: 0.13 K/UL (ref 0–0.51)
EOSINOPHIL NFR BLD: 1.8 % (ref 0–6.9)
ERYTHROCYTE [DISTWIDTH] IN BLOOD BY AUTOMATED COUNT: 41.3 FL (ref 35.9–50)
GFR SERPLBLD CREATININE-BSD FMLA CKD-EPI: 104 ML/MIN/1.73 M 2
GLOBULIN SER CALC-MCNC: 2.9 G/DL (ref 1.9–3.5)
GLUCOSE SERPL-MCNC: 91 MG/DL (ref 65–99)
GLUCOSE UR STRIP.AUTO-MCNC: NEGATIVE MG/DL
GLUCOSE UR STRIP.AUTO-MCNC: NEGATIVE MG/DL
HCT VFR BLD AUTO: 49.3 % (ref 37–47)
HGB BLD-MCNC: 16.4 G/DL (ref 12–16)
IMM GRANULOCYTES # BLD AUTO: 0.02 K/UL (ref 0–0.11)
IMM GRANULOCYTES NFR BLD AUTO: 0.3 % (ref 0–0.9)
KETONES UR STRIP.AUTO-MCNC: ABNORMAL MG/DL
KETONES UR STRIP.AUTO-MCNC: NEGATIVE MG/DL
LEUKOCYTE ESTERASE UR QL STRIP.AUTO: NEGATIVE
LEUKOCYTE ESTERASE UR QL STRIP.AUTO: NEGATIVE
LIPASE SERPL-CCNC: 39 U/L (ref 11–82)
LYMPHOCYTES # BLD AUTO: 2.9 K/UL (ref 1–4.8)
LYMPHOCYTES NFR BLD: 39.4 % (ref 22–41)
MCH RBC QN AUTO: 29.8 PG (ref 27–33)
MCHC RBC AUTO-ENTMCNC: 33.3 G/DL (ref 32.2–35.5)
MCV RBC AUTO: 89.5 FL (ref 81.4–97.8)
MICRO URNS: ABNORMAL
MONOCYTES # BLD AUTO: 0.33 K/UL (ref 0–0.85)
MONOCYTES NFR BLD AUTO: 4.5 % (ref 0–13.4)
NEUTROPHILS # BLD AUTO: 3.93 K/UL (ref 1.82–7.42)
NEUTROPHILS NFR BLD: 53.3 % (ref 44–72)
NITRITE UR QL STRIP.AUTO: NEGATIVE
NITRITE UR QL STRIP.AUTO: NEGATIVE
NRBC # BLD AUTO: 0 K/UL
NRBC BLD-RTO: 0 /100 WBC (ref 0–0.2)
PH UR STRIP.AUTO: 7 [PH] (ref 5–8)
PH UR STRIP.AUTO: 7 [PH] (ref 5–8)
PLATELET # BLD AUTO: 291 K/UL (ref 164–446)
PMV BLD AUTO: 11.5 FL (ref 9–12.9)
POTASSIUM SERPL-SCNC: 4.3 MMOL/L (ref 3.6–5.5)
PROT SERPL-MCNC: 7.6 G/DL (ref 6–8.2)
PROT UR QL STRIP: NEGATIVE MG/DL
PROT UR QL STRIP: NEGATIVE MG/DL
RBC # BLD AUTO: 5.51 M/UL (ref 4.2–5.4)
RBC UR QL AUTO: NEGATIVE
RBC UR QL AUTO: NEGATIVE
SODIUM SERPL-SCNC: 139 MMOL/L (ref 135–145)
SP GR UR STRIP.AUTO: 1.01
SP GR UR STRIP.AUTO: >1.035
UROBILINOGEN UR STRIP-MCNC: 0.2 MG/DL
UROBILINOGEN UR STRIP.AUTO-MCNC: 0.2 EU/DL
WBC # BLD AUTO: 7.4 K/UL (ref 4.8–10.8)

## 2025-04-11 PROCEDURE — 74177 CT ABD & PELVIS W/CONTRAST: CPT

## 2025-04-11 PROCEDURE — 85025 COMPLETE CBC W/AUTO DIFF WBC: CPT

## 2025-04-11 PROCEDURE — 81002 URINALYSIS NONAUTO W/O SCOPE: CPT

## 2025-04-11 PROCEDURE — 81003 URINALYSIS AUTO W/O SCOPE: CPT

## 2025-04-11 PROCEDURE — 99283 EMERGENCY DEPT VISIT LOW MDM: CPT

## 2025-04-11 PROCEDURE — 3078F DIAST BP <80 MM HG: CPT

## 2025-04-11 PROCEDURE — 36415 COLL VENOUS BLD VENIPUNCTURE: CPT

## 2025-04-11 PROCEDURE — 3074F SYST BP LT 130 MM HG: CPT

## 2025-04-11 PROCEDURE — 1125F AMNT PAIN NOTED PAIN PRSNT: CPT

## 2025-04-11 PROCEDURE — 700117 HCHG RX CONTRAST REV CODE 255: Performed by: EMERGENCY MEDICINE

## 2025-04-11 PROCEDURE — 99214 OFFICE O/P EST MOD 30 MIN: CPT

## 2025-04-11 PROCEDURE — 80053 COMPREHEN METABOLIC PANEL: CPT

## 2025-04-11 PROCEDURE — 83690 ASSAY OF LIPASE: CPT

## 2025-04-11 RX ORDER — OXYBUTYNIN CHLORIDE 10 MG/1
10 TABLET, EXTENDED RELEASE ORAL DAILY
Qty: 30 TABLET | Refills: 0 | Status: SHIPPED | OUTPATIENT
Start: 2025-04-11

## 2025-04-11 RX ADMIN — IOHEXOL 100 ML: 350 INJECTION, SOLUTION INTRAVENOUS at 13:17

## 2025-04-11 ASSESSMENT — ENCOUNTER SYMPTOMS
WHEEZING: 0
MYALGIAS: 0
STRIDOR: 0
VOMITING: 0
HEADACHES: 0
BLOOD IN STOOL: 0
DIZZINESS: 0
NAUSEA: 0
DIARRHEA: 0
FLANK PAIN: 0
SHORTNESS OF BREATH: 0
BACK PAIN: 0
WEAKNESS: 0
ABDOMINAL PAIN: 1
CONSTIPATION: 0
CHILLS: 1
FOCAL WEAKNESS: 0
SPUTUM PRODUCTION: 0
NECK PAIN: 0
FEVER: 0
COUGH: 0

## 2025-04-11 ASSESSMENT — FIBROSIS 4 INDEX
FIB4 SCORE: 0.72
FIB4 SCORE: 0.72

## 2025-04-11 ASSESSMENT — PAIN DESCRIPTION - PAIN TYPE
TYPE: ACUTE PAIN
TYPE: ACUTE PAIN

## 2025-04-11 ASSESSMENT — PAIN SCALES - GENERAL: PAINLEVEL_OUTOF10: 7=MODERATE-SEVERE PAIN

## 2025-04-11 ASSESSMENT — VISUAL ACUITY: OU: 1

## 2025-04-11 NOTE — ED TRIAGE NOTES
Chief Complaint   Patient presents with    Bladder Infection     Finished antibiotics for UTI 2 weeks ago, still experiencing pain, frequency, and fatigue. Denies fevers. Reports chills since yesterday.      /77   Pulse (!) 58   Temp 36.4 °C (97.6 °F) (Temporal)   Resp 14   Ht 1.524 m (5')   Wt 59.3 kg (130 lb 11.7 oz)   SpO2 95%   BMI 25.53 kg/m²     myelogram

## 2025-04-11 NOTE — PROGRESS NOTES
Subjective     Елена Fowler is a 54 y.o. female who presents with Abdominal Pain (Lower abdominal pain pelvic area x 2 weeks patient states she's having frequency with urination )    HPI:   Елена is a 53yo female presenting for abdominal pain x2 weeks. She was recently seen 3/30/25 and 4/6/25 and treated for Enterococcus faecalis UTI. Patient reports no improvement in abdominal pain. Reports pain as 6/10 and sharp in nature. Pain is in periumbilical region and does not radiate. Denies dysuria or pelvic pain. No abnormal vaginal discharge or itching. Denies hematuria. No blood in stool. Denies fever, vomiting, or shortness of breath. She has attempted ibuprofen for relief.      Review of Systems   Constitutional:  Positive for chills. Negative for fever and malaise/fatigue.   Respiratory:  Negative for cough, sputum production, shortness of breath, wheezing and stridor.    Gastrointestinal:  Positive for abdominal pain. Negative for blood in stool, constipation, diarrhea, melena, nausea and vomiting.   Genitourinary:  Negative for dysuria, flank pain, frequency, hematuria and urgency.   Musculoskeletal:  Negative for back pain, myalgias and neck pain.   Skin:  Negative for rash.   Neurological:  Negative for dizziness, focal weakness, weakness and headaches.     Past Medical History:   Diagnosis Date    Allergy     Anxiety     Depression     GERD (gastroesophageal reflux disease)      Past Surgical History:   Procedure Laterality Date    ABDOMINAL HYSTERECTOMY TOTAL      EYE SURGERY      TUBAL COAGULATION LAPAROSCOPIC BILATERAL       Allergies: Benadryl allergy, Penicillins, and Benadryl [diphenhydramine]     Social History     Tobacco Use    Smoking status: Every Day     Types: Electronic Cigarettes    Smokeless tobacco: Never   Vaping Use    Vaping status: Never Used   Substance Use Topics    Alcohol use: Yes     Alcohol/week: 0.6 oz     Types: 1 Glasses of wine per week     Comment: occ    Drug use: Yes      Frequency: 7.0 times per week     Types: Inhaled, Marijuana     Family History   Problem Relation Age of Onset    Hypertension Father     Breast Cancer Maternal Aunt     Cancer Maternal Aunt     Stroke Maternal Aunt     Breast Cancer Maternal Aunt     Heart Disease Paternal Grandmother     Heart Disease Paternal Grandfather     Ovarian Cancer Neg Hx     Peritoneal Cancer Neg Hx     Tubal Cancer Neg Hx     Colorectal Cancer Neg Hx     Diabetes Neg Hx     Hyperlipidemia Neg Hx      Medications, Allergies, and current problem list reviewed today in Epic.      Objective     /60 (BP Location: Right arm, Patient Position: Sitting, BP Cuff Size: Adult)   Pulse (!) 59   Temp 36.8 °C (98.2 °F) (Temporal)   Resp 14   Ht 1.524 m (5')   Wt 59.3 kg (130 lb 12.8 oz)   SpO2 97%   BMI 25.55 kg/m²      Physical Exam  Vitals reviewed.   Constitutional:       General: She is not in acute distress.  HENT:      Nose: Nose normal.   Eyes:      General: Vision grossly intact. Gaze aligned appropriately. No visual field deficit.     Extraocular Movements: Extraocular movements intact.      Conjunctiva/sclera: Conjunctivae normal.      Pupils: Pupils are equal, round, and reactive to light.   Cardiovascular:      Rate and Rhythm: Normal rate and regular rhythm.      Pulses: Normal pulses.      Heart sounds: Normal heart sounds.   Pulmonary:      Effort: Pulmonary effort is normal. No tachypnea, accessory muscle usage, prolonged expiration, respiratory distress or retractions.      Breath sounds: Normal breath sounds. No stridor, decreased air movement or transmitted upper airway sounds.   Abdominal:      General: Abdomen is flat. Bowel sounds are normal. There is no distension.      Palpations: Abdomen is soft. There is no mass.      Tenderness: There is abdominal tenderness in the periumbilical area. There is no right CVA tenderness, left CVA tenderness, guarding or rebound.      Hernia: No hernia is present.    Musculoskeletal:         General: Normal range of motion.      Cervical back: Full passive range of motion without pain, normal range of motion and neck supple. No rigidity. Normal range of motion.   Skin:     General: Skin is warm and dry.      Findings: No rash.   Neurological:      Mental Status: She is alert. Mental status is at baseline.      Sensory: Sensation is intact.      Motor: Motor function is intact.      Coordination: Coordination is intact.      Gait: Gait is intact.   Psychiatric:         Mood and Affect: Mood normal.         Behavior: Behavior normal.         Thought Content: Thought content normal.       Results for orders placed or performed in visit on 04/11/25   POCT Urinalysis    Collection Time: 04/11/25 11:19 AM   Result Value Ref Range    POC Color yellow Negative    POC Appearance clear Negative    POC Glucose negative Negative mg/dL    POC Bilirubin negative Negative mg/dL    POC Ketones negative Negative mg/dL    POC Specific Gravity 1.015 <1.005 - >1.030    POC Blood negative Negative    POC Urine PH 7.0 5.0 - 8.0    POC Protein negative Negative mg/dL    POC Urobiligen 0.2 Negative (0.2) mg/dL    POC Nitrites negative Negative    POC Leukocyte Esterase negative Negative     Assessment & Plan    1. Periumbilical abdominal pain   - POCT Urinalysis       MDM/Comments:   Patient presenting with periumbilical abdominal pain. Recent treatment for UTI with nitrofurantoin. Urinalysis today without evidence of infection.   Tenderness to palpation of periumbilical region. Unable to rule out emergent intraabdominal pathology at this time, warranting transfer to higher level of care for further evaluation.   Patient verbalizes understanding and is in agreement with the plan of care.     Differential diagnosis, natural history, supportive care, and indications for immediate follow-up discussed.       Disposition:     Higher level care via private car in stable condition       I personally  reviewed prior external notes and test results pertinent to today's visit.  I have independently reviewed and interpreted all diagnostics ordered during this urgent care visit.                              Electronically signed by RUT Cristina

## 2025-04-11 NOTE — ED PROVIDER NOTES
ED Provider Note    CHIEF COMPLAINT  Chief Complaint   Patient presents with    Bladder Infection     Finished antibiotics for UTI 2 weeks ago, still experiencing pain, frequency, and fatigue. Denies fevers. Reports chills since yesterday.       EXTERNAL RECORDS REVIEWED  Notes to urgent care, patient has been seen in urgent care on 30 March and then on the sixth.  Patient analysis initially positive for Enterococcus, negative the second time.  She was given a single dose of fosfomycin    HPI/ROS      Елена Fowler is a 54 y.o. female who presents ongoing dysuria and frequency.  She denies fevers or significant flank pain.  Patient was diagnosed with a urinary tract infection 4/6/2025, culture positive for Enterococcus, finished a course of antibiotics for this.  Reports that she is having ongoing suprapubic pain.  She reports suprapubic pain is relatively constant.  It is not [employer] worse or better when she urinates.  She does report some increased urinary frequency.  Patient denies any associated flank pain.  She denies any associated nausea or vomiting.  She denies any significant changes in her bowel movements.  Patient denies any associated neck or back pain.  She denies any vaginal discharge or vaginal bleeding.  Denies any new sexual partners.    PAST MEDICAL HISTORY   has a past medical history of Allergy, Anxiety, Depression, and GERD (gastroesophageal reflux disease).    SURGICAL HISTORY   has a past surgical history that includes abdominal hysterectomy total; eye surgery; and tubal coagulation laparoscopic bilateral.    FAMILY HISTORY  Family History   Problem Relation Age of Onset    Hypertension Father     Breast Cancer Maternal Aunt     Cancer Maternal Aunt     Stroke Maternal Aunt     Breast Cancer Maternal Aunt     Heart Disease Paternal Grandmother     Heart Disease Paternal Grandfather     Ovarian Cancer Neg Hx     Peritoneal Cancer Neg Hx     Tubal Cancer Neg Hx     Colorectal Cancer Neg Hx      Diabetes Neg Hx     Hyperlipidemia Neg Hx        SOCIAL HISTORY  Social History     Tobacco Use    Smoking status: Every Day     Types: Electronic Cigarettes    Smokeless tobacco: Never   Vaping Use    Vaping status: Never Used   Substance and Sexual Activity    Alcohol use: Yes     Alcohol/week: 0.6 oz     Types: 1 Glasses of wine per week     Comment: occ    Drug use: Yes     Frequency: 7.0 times per week     Types: Inhaled, Marijuana    Sexual activity: Not Currently     Partners: Female, Male       CURRENT MEDICATIONS  Home Medications       Reviewed by Love Frankel R.N. (Registered Nurse) on 04/11/25 at 1220  Med List Status: Not Addressed     Medication Last Dose Status   dicyclomine (BENTYL) 10 MG Cap  Active   ergocalciferol (DRISDOL) 38621 UNIT capsule  Active   famotidine (PEPCID) 20 MG Tab  Active   metroNIDAZOLE (METROGEL-VAGINAL) 0.75 % Gel  Active   omeprazole (PRILOSEC) 20 MG delayed-release capsule  Active   pantoprazole (PROTONIX) 40 MG Tablet Delayed Response  Active   sucralfate (CARAFATE) 1 GM Tab  Active   valACYclovir (VALTREX) 500 MG Tab  Active                    ALLERGIES  Allergies   Allergen Reactions    Benadryl Allergy      Other Reaction(s): Hyperactivity    Penicillins Anaphylaxis and Unspecified     Was told as a baby    Benadryl [Diphenhydramine]        PHYSICAL EXAM  VITAL SIGNS: /77   Pulse (!) 58   Temp 36.4 °C (97.6 °F) (Temporal)   Resp 14   Ht 1.524 m (5')   Wt 59.3 kg (130 lb 11.7 oz)   SpO2 95%   BMI 25.53 kg/m²    Physical Exam  Constitutional:       Appearance: She is well-developed.   HENT:      Head: Normocephalic and atraumatic.   Eyes:      Pupils: Pupils are equal, round, and reactive to light.   Cardiovascular:      Rate and Rhythm: Normal rate and regular rhythm.   Pulmonary:      Effort: Pulmonary effort is normal. No accessory muscle usage or respiratory distress.      Breath sounds: Normal breath sounds.   Abdominal:      General: Bowel sounds are  normal.      Palpations: Abdomen is soft. There is no mass.      Tenderness: There is abdominal tenderness.      Comments: Mild suprapubic tenderness without guarding or rebound   Musculoskeletal:         General: Normal range of motion.   Skin:     General: Skin is warm.      Capillary Refill: Capillary refill takes less than 2 seconds.   Neurological:      General: No focal deficit present.      Mental Status: She is alert.   Psychiatric:         Mood and Affect: Mood normal. Mood is not anxious.           EKG/LABS  Results for orders placed or performed during the hospital encounter of 04/11/25   CBC WITH DIFFERENTIAL    Collection Time: 04/11/25  1:05 PM   Result Value Ref Range    WBC 7.4 4.8 - 10.8 K/uL    RBC 5.51 (H) 4.20 - 5.40 M/uL    Hemoglobin 16.4 (H) 12.0 - 16.0 g/dL    Hematocrit 49.3 (H) 37.0 - 47.0 %    MCV 89.5 81.4 - 97.8 fL    MCH 29.8 27.0 - 33.0 pg    MCHC 33.3 32.2 - 35.5 g/dL    RDW 41.3 35.9 - 50.0 fL    Platelet Count 291 164 - 446 K/uL    MPV 11.5 9.0 - 12.9 fL    Neutrophils-Polys 53.30 44.00 - 72.00 %    Lymphocytes 39.40 22.00 - 41.00 %    Monocytes 4.50 0.00 - 13.40 %    Eosinophils 1.80 0.00 - 6.90 %    Basophils 0.70 0.00 - 1.80 %    Immature Granulocytes 0.30 0.00 - 0.90 %    Nucleated RBC 0.00 0.00 - 0.20 /100 WBC    Neutrophils (Absolute) 3.93 1.82 - 7.42 K/uL    Lymphs (Absolute) 2.90 1.00 - 4.80 K/uL    Monos (Absolute) 0.33 0.00 - 0.85 K/uL    Eos (Absolute) 0.13 0.00 - 0.51 K/uL    Baso (Absolute) 0.05 0.00 - 0.12 K/uL    Immature Granulocytes (abs) 0.02 0.00 - 0.11 K/uL    NRBC (Absolute) 0.00 K/uL   CMP    Collection Time: 04/11/25  1:05 PM   Result Value Ref Range    Sodium 139 135 - 145 mmol/L    Potassium 4.3 3.6 - 5.5 mmol/L    Chloride 104 96 - 112 mmol/L    Co2 24 20 - 33 mmol/L    Anion Gap 11.0 7.0 - 16.0    Glucose 91 65 - 99 mg/dL    Bun 11 8 - 22 mg/dL    Creatinine 0.62 0.50 - 1.40 mg/dL    Calcium 9.7 8.5 - 10.5 mg/dL    Correct Calcium 9.1 8.5 - 10.5 mg/dL     AST(SGOT) 23 12 - 45 U/L    ALT(SGPT) 38 2 - 50 U/L    Alkaline Phosphatase 81 30 - 99 U/L    Total Bilirubin 0.8 0.1 - 1.5 mg/dL    Albumin 4.7 3.2 - 4.9 g/dL    Total Protein 7.6 6.0 - 8.2 g/dL    Globulin 2.9 1.9 - 3.5 g/dL    A-G Ratio 1.6 g/dL   LIPASE    Collection Time: 04/11/25  1:05 PM   Result Value Ref Range    Lipase 39 11 - 82 U/L   ESTIMATED GFR    Collection Time: 04/11/25  1:05 PM   Result Value Ref Range    GFR (CKD-EPI) 104 >60 mL/min/1.73 m 2   URINALYSIS    Collection Time: 04/11/25  1:24 PM    Specimen: Urine   Result Value Ref Range    Micro Urine Req see below        I have independently interpreted this EKG    RADIOLOGY/PROCEDURES   I have independently interpreted the diagnostic imaging associated with this visit and am waiting the final reading from the radiologist.   My preliminary interpretation is as follows: no evidence of surgical process    Radiologist interpretation:  CT-ABDOMEN-PELVIS WITH   Final Result      1.  No evidence of bowel obstruction or focal inflammatory change.      2.  Fatty liver.      3.  The biparietal the process involving the left ilium most likely representing a benign process such as fibrous dysplasia.            COURSE & MEDICAL DECISION MAKING    ASSESSMENT, COURSE AND PLAN  Care Narrative: Patient here with presentation that appears most consistent with ongoing cystitis.  She was sent here from urgent care for CT evaluation of possible surgical pathology though my suspicion of this relatively low especially given patient's very reassuring exam.  Patient without any associated guarding or rebound.  Patient will have basic labs checked, given that she was sent here for CT will check.  CT abdomen pelvis fails to reveal any concerning findings.  White count is normal.  Patient                  FINAL DIAGNOSIS  1. Dysuria  Referral to Urology    oxybutynin SR (DITROPAN-XL) 10 MG CR tablet

## 2025-04-11 NOTE — DISCHARGE INSTRUCTIONS
CT was reassuring, he had some questionable changes of your left hip and pelvis bone, follow-up with your primary care provider regarding this, you may need an outpatient MRI at some point.  You can follow-up with urology for your likely interstitial cystitis.  I have started you on some oxybutynin for this.  Urine was inconsistent with infection

## 2025-04-15 NOTE — Clinical Note
REFERRAL APPROVAL NOTICE         Sent on April 15, 2025                   Елена Fowler  [address] [place] [address]                   Dear Ms. Fowler,    After a careful review of the medical information and benefit coverage, [facility] has processed your referral. See below for additional details.    If applicable, you must be actively enrolled with your insurance for coverage of the authorized service. If you have any questions regarding your coverage, please contact your insurance directly.    REFERRAL INFORMATION   Referral #:  [id number]  Referred-To Department    Referred-By Provider:  Urology    Reggie Miramontes M.D.   [facility]      [address]  [address]  [place] [id number] [phone] [address]  [place] [id number] [phone]    Referral Start Date:  04/11/2025  Referral End Date:   04/11/2026             SCHEDULING  If you do not already have an appointment, please call [phone] to make an appointment.     MORE INFORMATION  If you do not already have a [facility] account, sign up at: [email or web address]  You can access your medical information, make appointments, see lab results, billing information, and more.  If you have questions regarding this referral, please contact  the [facility] Referrals department at:             [phone]. Monday - Friday 8:00AM - 5:00PM.     Sincerely,    [facility]

## 2025-05-05 ENCOUNTER — OFFICE VISIT (OUTPATIENT)
Dept: UROLOGY | Facility: MEDICAL CENTER | Age: 55
End: 2025-05-05
Payer: OTHER GOVERNMENT

## 2025-05-05 ENCOUNTER — HOSPITAL ENCOUNTER (OUTPATIENT)
Facility: MEDICAL CENTER | Age: 55
End: 2025-05-05
Attending: STUDENT IN AN ORGANIZED HEALTH CARE EDUCATION/TRAINING PROGRAM
Payer: OTHER GOVERNMENT

## 2025-05-05 DIAGNOSIS — R30.0 DYSURIA: ICD-10-CM

## 2025-05-05 DIAGNOSIS — N32.81 OVERACTIVE BLADDER: ICD-10-CM

## 2025-05-05 LAB
APPEARANCE UR: CLEAR
APPEARANCE UR: CLEAR
BILIRUB UR QL STRIP.AUTO: NEGATIVE
BILIRUB UR STRIP-MCNC: NORMAL MG/DL
COLOR UR AUTO: YELLOW
COLOR UR: YELLOW
GLUCOSE UR STRIP.AUTO-MCNC: NEGATIVE MG/DL
GLUCOSE UR STRIP.AUTO-MCNC: NORMAL MG/DL
KETONES UR STRIP.AUTO-MCNC: NEGATIVE MG/DL
KETONES UR STRIP.AUTO-MCNC: NORMAL MG/DL
LEUKOCYTE ESTERASE UR QL STRIP.AUTO: NEGATIVE
LEUKOCYTE ESTERASE UR QL STRIP.AUTO: NORMAL
MICRO URNS: NORMAL
NITRITE UR QL STRIP.AUTO: NEGATIVE
NITRITE UR QL STRIP.AUTO: NORMAL
PH UR STRIP.AUTO: 6.5 [PH] (ref 5–8)
PH UR STRIP.AUTO: 6.5 [PH] (ref 5–8)
PROT UR QL STRIP: NEGATIVE MG/DL
PROT UR QL STRIP: NORMAL MG/DL
RBC UR QL AUTO: NEGATIVE
RBC UR QL AUTO: NORMAL
SP GR UR STRIP.AUTO: 1.02
SP GR UR STRIP.AUTO: 1.02
UROBILINOGEN UR STRIP-MCNC: 0.2 MG/DL
UROBILINOGEN UR STRIP.AUTO-MCNC: 0.2 EU/DL

## 2025-05-05 PROCEDURE — 87086 URINE CULTURE/COLONY COUNT: CPT

## 2025-05-05 PROCEDURE — 99204 OFFICE O/P NEW MOD 45 MIN: CPT | Performed by: STUDENT IN AN ORGANIZED HEALTH CARE EDUCATION/TRAINING PROGRAM

## 2025-05-05 PROCEDURE — 81002 URINALYSIS NONAUTO W/O SCOPE: CPT | Performed by: STUDENT IN AN ORGANIZED HEALTH CARE EDUCATION/TRAINING PROGRAM

## 2025-05-05 PROCEDURE — 81003 URINALYSIS AUTO W/O SCOPE: CPT

## 2025-05-05 PROCEDURE — 87086 URINE CULTURE/COLONY COUNT: CPT | Mod: 91

## 2025-05-05 RX ORDER — PHENAZOPYRIDINE HYDROCHLORIDE 200 MG/1
200 TABLET, FILM COATED ORAL 3 TIMES DAILY PRN
Qty: 6 TABLET | Refills: 0 | Status: SHIPPED | OUTPATIENT
Start: 2025-05-05 | End: 2025-05-27

## 2025-05-05 RX ORDER — ESTRADIOL 0.1 MG/G
CREAM VAGINAL
Qty: 42.5 G | Refills: 4 | Status: SHIPPED | OUTPATIENT
Start: 2025-05-05 | End: 2025-06-18

## 2025-05-05 NOTE — PROGRESS NOTES
"Subjective    CHIEF COMPLAINT:    Patient presents to the office today to discuss:    1. Bladder discomfort  2. Urinary frequency  3. Pelvic pressure    HPI TODAY: 05/05/2025:    54-year-old presents with bladder discomfort ongoing for over a month. Initially thought to be UTI, treated with 5-day antibiotic course without symptom resolution. Subsequently evaluated at urgent care and referred to ER. ER prescribed oxybutynin for possible overactive bladder, currently on day 23 of 30-day course. Reports persistent discomfort including sensation of needing to urinate with minimal output. Describes pulling sensation with stretching and discomfort with prolonged sitting. Urinary frequency 8-10 times daily. Taking oxybutynin at night only, which has helped with nighttime symptoms but not completely resolved discomfort. Reports no blood in urine, no incontinence. Has been \"eating clean\" to eliminate dietary triggers. Reports history of severe food poisoning prior to symptom onset. Denies kidney pain. Bowel movements regular without straining.      Family History   Problem Relation Age of Onset    Hypertension Father     Breast Cancer Maternal Aunt     Cancer Maternal Aunt     Stroke Maternal Aunt     Breast Cancer Maternal Aunt     Heart Disease Paternal Grandmother     Heart Disease Paternal Grandfather     Ovarian Cancer Neg Hx     Peritoneal Cancer Neg Hx     Tubal Cancer Neg Hx     Colorectal Cancer Neg Hx     Diabetes Neg Hx     Hyperlipidemia Neg Hx        Social History     Socioeconomic History    Marital status:      Spouse name: Not on file    Number of children: Not on file    Years of education: Not on file    Highest education level: 12th grade   Occupational History    Not on file   Tobacco Use    Smoking status: Every Day     Types: Electronic Cigarettes    Smokeless tobacco: Never   Vaping Use    Vaping status: Never Used   Substance and Sexual Activity    Alcohol use: Yes     Alcohol/week: 0.6 oz     " Types: 1 Glasses of wine per week     Comment: occ    Drug use: Yes     Frequency: 7.0 times per week     Types: Inhaled, Marijuana    Sexual activity: Not Currently     Partners: Female, Male   Other Topics Concern    Not on file   Social History Narrative    Not on file     Social Drivers of Health     Financial Resource Strain: Patient Declined (12/8/2023)    Overall Financial Resource Strain (CARDIA)     Difficulty of Paying Living Expenses: Patient declined   Food Insecurity: Patient Declined (12/8/2023)    Hunger Vital Sign     Worried About Running Out of Food in the Last Year: Patient declined     Ran Out of Food in the Last Year: Patient declined   Transportation Needs: No Transportation Needs (12/8/2023)    PRAPARE - Transportation     Lack of Transportation (Medical): No     Lack of Transportation (Non-Medical): No   Physical Activity: Inactive (12/8/2023)    Exercise Vital Sign     Days of Exercise per Week: 0 days     Minutes of Exercise per Session: 0 min   Stress: Stress Concern Present (12/8/2023)    [employer] - Occupational Stress Questionnaire     Feeling of Stress : Very much   Social Connections: Unknown (12/8/2023)    Social Connection and Isolation Panel [NHANES]     Frequency of Communication with Friends and Family: More than three times a week     Frequency of Social Gatherings with Friends and Family: Patient declined     Attends Protestant Services: Patient declined     Active Member of Clubs or Organizations: Patient declined     Attends Club or Organization Meetings: Patient declined     Marital Status:    Intimate Partner Violence: Not on file   Housing Stability: High Risk (12/8/2023)    Housing Stability Vital Sign     Unable to Pay for Housing in the Last Year: Patient refused     Number of Places Lived in the Last Year: 3     Unstable Housing in the Last Year: No       Past Surgical History:   Procedure Laterality Date    ABDOMINAL HYSTERECTOMY  TOTAL      EYE SURGERY      TUBAL COAGULATION LAPAROSCOPIC BILATERAL         Past Medical History:   Diagnosis Date    Allergy     Anxiety     Depression     GERD (gastroesophageal reflux disease)        Current Outpatient Medications   Medication Sig Dispense Refill    oxybutynin SR (DITROPAN-XL) 10 MG CR tablet Take 1 Tablet by mouth every day. 30 Tablet 0    valACYclovir (VALTREX) 500 MG Tab Take 1 Tablet by mouth 2 times a day. Upon onset of prodrome take 500 mg twice daily for 3 days. 3/3/25 FOLLOW UP REQUIRED FOR FUTURE REFILL. (Patient not taking: Reported on 4/11/2025) 40 Tablet 0    pantoprazole (PROTONIX) 40 MG Tablet Delayed Response  (Patient not taking: Reported on 9/19/2024)      dicyclomine (BENTYL) 10 MG Cap Take 1 Capsule by mouth 4 Times a Day,Before Meals and at Bedtime. (Patient not taking: Reported on 9/19/2024) 120 Capsule 1    sucralfate (CARAFATE) 1 GM Tab Take 1 Tablet by mouth 4 Times a Day,Before Meals and at Bedtime. (Patient not taking: Reported on 9/19/2024) 120 Tablet 3    omeprazole (PRILOSEC) 20 MG delayed-release capsule Take 1 Capsule by mouth every day. (Patient not taking: Reported on 9/19/2024) 30 Capsule 1    famotidine (PEPCID) 20 MG Tab Take 1 Tablet by mouth 2 times a day. (Patient not taking: Reported on 9/19/2024) 60 Tablet 1    ergocalciferol (DRISDOL) 01256 UNIT capsule Take 1 Capsule by mouth every 7 days. (Patient not taking: Reported on 9/19/2024) 12 Capsule 0     No current facility-administered medications for this visit.       Allergies   Allergen Reactions    Benadryl Allergy      Other Reaction(s): Hyperactivity    Penicillins Anaphylaxis and Unspecified     Was told as a baby    Benadryl [Diphenhydramine]        Objective  There were no vitals taken for this visit.  Physical Exam  Constitutional:       Appearance: Normal appearance.   HENT:      Head: Normocephalic and atraumatic.   Pulmonary:      Effort: Pulmonary effort is normal.   Abdominal:      General:  "Abdomen is flat. There is no distension.      Palpations: Abdomen is soft.      Tenderness: There is abdominal tenderness. There is no right CVA tenderness or left CVA tenderness.   Skin:     General: Skin is warm and dry.   Neurological:      General: No focal deficit present.      Mental Status: She is alert.   Psychiatric:         Mood and Affect: Mood normal.         Behavior: Behavior normal.         Labs:   POCT UA   Lab Results   Component Value Date/Time    POCCOLOR yellow 05/05/2025 01:43 PM    POCAPPEAR clear 05/05/2025 01:43 PM    POCLEUKEST neg 05/05/2025 01:43 PM    POCNITRITE neg 05/05/2025 01:43 PM    POCUROBILIGE 0.2 05/05/2025 01:43 PM    POCPROTEIN neg 05/05/2025 01:43 PM    POCURPH 6.5 05/05/2025 01:43 PM    POCBLOOD neg 05/05/2025 01:43 PM    POCSPGRV 1.020 05/05/2025 01:43 PM    POCKETONES neg 05/05/2025 01:43 PM    POCBILIRUBIN neg 05/05/2025 01:43 PM    POCGLUCUA neg 05/05/2025 01:43 PM      BMP   Lab Results   Component Value Date/Time    SODIUM 139 04/11/2025 1305    POTASSIUM 4.3 04/11/2025 1305    CHLORIDE 104 04/11/2025 1305    CO2 24 04/11/2025 1305    GLUCOSE 91 04/11/2025 1305    BUN 11 04/11/2025 1305    CREATININE 0.62 04/11/2025 1305    CALCIUM 9.7 04/11/2025 1305       Imaging: none    Assessment    1. Possible urinary tract infection (N39.0)    - Assessment: Symptoms consistent with possible UTI, though previous cultures showed only \"slight\" infection. Considering atypical bacteria not detected on standard culture.  - Plan: Urine analysis with culture and PCR testing for atypical bacteria ordered today. Will prescribe phenazopyridine for symptom relief while awaiting results.  - Counseling: Discussed importance of hydration and daily cranberry supplement for UTI prevention.    2. Possible interstitial cystitis (N30.10)    - Assessment: Symptoms of bladder pain, frequency, and urgency for >4 weeks could be consistent with interstitial cystitis.  - Plan: Will consider bladder " installation if infection workup negative. Discussed elimination diet avoiding caffeine, alcohol, spicy foods, citrus, tomato products.  - Counseling: Discussed treatment options including bladder installations, antihistamines, bladder Botox, and sacral neuromodulation if needed.    3. Possible genitourinary syndrome of menopause (N95.8)    - Assessment: At 54, postmenopausal changes to urogenital tissue could contribute to symptoms.  - Plan: Prescribed vaginal estrogen cream (Estrace) daily for 2 weeks, then twice weekly.  - Counseling: Discussed benefits of vaginal estrogen including reduced inflammation, restored acid-base balance, improved lubrication, and reduced infection risk. Explained safety profile.    4. Possible pelvic floor dysfunction (M62.89)    - Assessment: Symptoms could be related to pelvic floor muscle dysfunction.  - Plan: Referral placed for pelvic floor physical therapy.  - Counseling: Discussed role of pelvic floor in supporting bladder and other pelvic organs.    Plan    Problem List Items Addressed This Visit    None  Visit Diagnoses         Dysuria              ORDERS:    Urinalysis with culture  PCR testing for atypical urinary pathogens  Phenazopyridine for bladder discomfort  Vaginal estrogen cream (Estrace)  Pelvic floor physical therapy referral    FOLLOW UP:    Return in 1 month to reassess symptoms. Will contact with culture results when available. Patient instructed to message through [employer] with any worsening symptoms.    SHORT SUMMARY:    54-year-old female with 1-month history of bladder discomfort evaluated for possible UTI vs interstitial cystitis vs genitourinary syndrome of menopause; treatment initiated with phenazopyridine and vaginal estrogen with pending urine studies.

## 2025-05-06 ENCOUNTER — TELEPHONE (OUTPATIENT)
Dept: UROLOGY | Facility: MEDICAL CENTER | Age: 55
End: 2025-05-06
Payer: OTHER GOVERNMENT

## 2025-05-06 NOTE — Clinical Note
REFERRAL APPROVAL NOTICE         Sent on May 6, 2025                   Елена Fowler  [address] [place] [address]                   Dear Ms. Fowler,    After a careful review of the medical information and benefit coverage, [facility] has processed your referral. See below for additional details.    If applicable, you must be actively enrolled with your insurance for coverage of the authorized service. If you have any questions regarding your coverage, please contact your insurance directly.    REFERRAL INFORMATION   Referral #:  [id number]  Referred-To Provider    Referred-By Provider:  Physical Therapy    Felicia Owens M.D.   [facility] [address]  [place] [address]  [phone] [address]  [place] [address]  [phone]    Referral Start Date:  05/05/2025  Referral End Date:   09/30/2025             SCHEDULING  If you do not already have an appointment, please call [phone] to make an appointment.     MORE INFORMATION  If you do not already have a [facility] account, sign up at: [email or web address]  You can access your medical information, make appointments, see lab results, billing information, and more.  If you have questions regarding this referral, please contact  the [facility] Referrals department at:             [phone]. Monday - Friday 8:00AM - 5:00PM.     Sincerely,    [facility]

## 2025-05-06 NOTE — TELEPHONE ENCOUNTER
VOICEMAIL  1. Caller Name: [facility]                       Call Back Number: [phone]    2. Message: Shalini from [facility] left a voicemail stating there was a lab sent for Urogenital Ureaplasma / Mycoplasma they did not receive the right specimen.      3. Patient approves office to leave a detailed voicemail/MyChart message: yes

## 2025-05-07 LAB
BACTERIA UR CULT: NORMAL
SIGNIFICANT IND 70042: NORMAL
SITE SITE: NORMAL
SOURCE SOURCE: NORMAL

## 2025-05-07 NOTE — TELEPHONE ENCOUNTER
Caller Name: Елена Fowler    Call Back Number: [phone]      How would the patient prefer to be contacted with a response: Phone call OK to leave a detailed message    Spoke with patient she is aware she will go to any [facility] to leave a urine sample.   Spoke with Shalini from [facility] lab she states no order needs to place because they did not run the urine.

## 2025-05-07 NOTE — TELEPHONE ENCOUNTER
Caller Name: [facility]   Call Back Number: [phone]    How would the patient prefer to be contacted with a response: Phone call OK to leave a detailed message    Spoke with Shalini from [facility] she states no order needs to place because they did not run the urine.

## 2025-05-08 LAB
BACTERIA UR CULT: NORMAL
SIGNIFICANT IND 70042: NORMAL
SITE SITE: NORMAL
SOURCE SOURCE: NORMAL

## 2025-05-09 ENCOUNTER — OFFICE VISIT (OUTPATIENT)
Dept: URGENT CARE | Facility: PHYSICIAN GROUP | Age: 55
End: 2025-05-09
Payer: OTHER GOVERNMENT

## 2025-05-09 ENCOUNTER — RESULTS FOLLOW-UP (OUTPATIENT)
Dept: URGENT CARE | Facility: PHYSICIAN GROUP | Age: 55
End: 2025-05-09

## 2025-05-09 VITALS
SYSTOLIC BLOOD PRESSURE: 106 MMHG | DIASTOLIC BLOOD PRESSURE: 60 MMHG | HEIGHT: 60 IN | TEMPERATURE: 98.4 F | WEIGHT: 129 LBS | RESPIRATION RATE: 16 BRPM | OXYGEN SATURATION: 97 % | HEART RATE: 68 BPM | BODY MASS INDEX: 25.32 KG/M2

## 2025-05-09 DIAGNOSIS — J06.9 VIRAL URI: ICD-10-CM

## 2025-05-09 DIAGNOSIS — R11.2 NAUSEA AND VOMITING, UNSPECIFIED VOMITING TYPE: ICD-10-CM

## 2025-05-09 LAB
FLUAV RNA SPEC QL NAA+PROBE: NEGATIVE
FLUBV RNA SPEC QL NAA+PROBE: POSITIVE
RSV RNA SPEC QL NAA+PROBE: NEGATIVE
SARS-COV-2 RNA RESP QL NAA+PROBE: NEGATIVE

## 2025-05-09 PROCEDURE — 0241U POCT CEPHEID COV-2, FLU A/B, RSV - PCR: CPT

## 2025-05-09 PROCEDURE — 99214 OFFICE O/P EST MOD 30 MIN: CPT

## 2025-05-09 PROCEDURE — 3074F SYST BP LT 130 MM HG: CPT

## 2025-05-09 PROCEDURE — 3078F DIAST BP <80 MM HG: CPT

## 2025-05-09 RX ORDER — GRANULES FOR ORAL 3 G/1
POWDER ORAL
COMMUNITY
Start: 2025-04-07 | End: 2025-05-27

## 2025-05-09 RX ORDER — ONDANSETRON 4 MG/1
4 TABLET, ORALLY DISINTEGRATING ORAL EVERY 6 HOURS PRN
Qty: 15 TABLET | Refills: 0 | Status: SHIPPED | OUTPATIENT
Start: 2025-05-09

## 2025-05-09 ASSESSMENT — ENCOUNTER SYMPTOMS
COUGH: 1
CHILLS: 0
FEVER: 0
NAUSEA: 1

## 2025-05-09 ASSESSMENT — FIBROSIS 4 INDEX: FIB4 SCORE: 0.72

## 2025-05-09 NOTE — PROGRESS NOTES
CHIEF COMPLAINT  Chief Complaint   Patient presents with    Flu Like Symptoms     X5 days      Subjective:   Елена Fowler is a 54 y.o. female who presents to urgent care with concerns for flulike symptoms x 5 days.  Patient reports symptoms of sinus congestion/pressure, as well as subjective fever and bodyaches.  She does endorse symptoms of nausea and vomiting.  Denies any abdominal pain.  No diarrhea.  Patient has been attempting to alleviate symptoms with OTC analgesics as well as Mucinex and Flonase.  Patient states that she has been tolerating small sips of fluids but still is persistently nauseous today.     Review of Systems   Constitutional:  Negative for chills and fever.   HENT:  Positive for congestion.    Respiratory:  Positive for cough.    Gastrointestinal:  Positive for nausea.       PAST MEDICAL HISTORY  Patient Active Problem List    Diagnosis Date Noted    Vitamin D deficiency 02/28/2024    Mixed hyperlipidemia 02/28/2024    Erythrocytosis 02/28/2024    Sinusitis 12/11/2023    Mass of breast 12/11/2023    Lower back pain 12/11/2023    Insomnia 12/11/2023    Depression 12/11/2023    Carpal tunnel syndrome 12/11/2023    Bunion 12/11/2023    Abdominal pain 12/11/2023    HSV (herpes simplex virus) anogenital infection 12/11/2023    Anxiety 12/11/2023    Major depressive disorder 12/11/2023    Black stool 12/11/2023    Gastroesophageal reflux disease without esophagitis 12/11/2023    Weight loss 12/11/2023       SURGICAL HISTORY   has a past surgical history that includes abdominal hysterectomy total; eye surgery; and tubal coagulation laparoscopic bilateral.    ALLERGIES  Allergies   Allergen Reactions    Benadryl Allergy      Other Reaction(s): Hyperactivity    Penicillins Anaphylaxis and Unspecified     Was told as a baby    Benadryl [Diphenhydramine]        CURRENT MEDICATIONS  Home Medications       Reviewed by Jakob Cabral Ass't (Medical Assistant) on 05/09/25 at 1003  Med  List Status: <None>     Medication Last Dose Status   dicyclomine (BENTYL) 10 MG Cap  Active   ergocalciferol (DRISDOL) 73794 UNIT capsule  Active   estradiol (ESTRACE) 0.1 MG/GM vaginal cream Taking Active   famotidine (PEPCID) 20 MG Tab  Active   fosfomycin (MONUROL) 3 GM Pack Taking Active   omeprazole (PRILOSEC) 20 MG delayed-release capsule  Active   oxybutynin SR (DITROPAN-XL) 10 MG CR tablet Taking Active   pantoprazole (PROTONIX) 40 MG Tablet Delayed Response  Active   phenazopyridine (PYRIDIUM) 200 MG Tab Taking Active   sucralfate (CARAFATE) 1 GM Tab  Active   valACYclovir (VALTREX) 500 MG Tab Not Taking Active                    SOCIAL HISTORY  Social History     Tobacco Use    Smoking status: Every Day     Types: Electronic Cigarettes    Smokeless tobacco: Never   Vaping Use    Vaping status: Never Used   Substance and Sexual Activity    Alcohol use: Yes     Alcohol/week: 0.6 oz     Types: 1 Glasses of wine per week     Comment: occ    Drug use: Yes     Frequency: 7.0 times per week     Types: Inhaled, Marijuana    Sexual activity: Not Currently     Partners: Female, Male       FAMILY HISTORY  Family History   Problem Relation Age of Onset    Hypertension Father     Breast Cancer Maternal Aunt     Cancer Maternal Aunt     Stroke Maternal Aunt     Breast Cancer Maternal Aunt     Heart Disease Paternal Grandmother     Heart Disease Paternal Grandfather     Ovarian Cancer Neg Hx     Peritoneal Cancer Neg Hx     Tubal Cancer Neg Hx     Colorectal Cancer Neg Hx     Diabetes Neg Hx     Hyperlipidemia Neg Hx          Medications, Allergies, and current problem list reviewed today in Epic.     Objective:     /60 (BP Location: Right arm, Patient Position: Sitting, BP Cuff Size: Adult)   Pulse 68   Temp 36.9 °C (98.4 °F) (Temporal)   Resp 16   Ht 1.524 m (5')   Wt 58.5 kg (129 lb)   SpO2 97%     Physical Exam  Vitals reviewed.   Constitutional:       General: She is not in acute distress.      Appearance: Normal appearance. She is not ill-appearing or toxic-appearing.   HENT:      Head: Normocephalic.      Right Ear: Tympanic membrane normal.      Left Ear: Tympanic membrane normal.      Nose: Congestion present.      Mouth/Throat:      Mouth: Mucous membranes are moist.      Pharynx: Oropharynx is clear.   Cardiovascular:      Rate and Rhythm: Normal rate and regular rhythm.      Pulses: Normal pulses.      Heart sounds: Normal heart sounds.   Pulmonary:      Effort: Pulmonary effort is normal. No respiratory distress.      Breath sounds: Normal breath sounds. No stridor. No wheezing, rhonchi or rales.   Musculoskeletal:      Cervical back: Normal range of motion and neck supple.   Skin:     General: Skin is warm.   Neurological:      General: No focal deficit present.      Mental Status: She is alert.   Psychiatric:         Mood and Affect: Mood normal.         Assessment/Plan:     Diagnosis and associated orders:     1. Nausea and vomiting, unspecified vomiting type  ondansetron (ZOFRAN ODT) 4 MG TABLET DISPERSIBLE      2. Viral URI  POCT CoV-2, Flu A/B, RSV by PCR         Comments/MDM:     The patient presents with symptoms suspicious for likely viral upper respiratory infection. Differential includes bacterial pneumonia, sinusitis, allergic rhinitis. Do not suspect underlying cardiopulmonary process.  Patient is nontoxic appearing and not in need of emergent medical intervention. They have a normal pulse oximetry on room air, afebrile, and a normal pulmonary exam. Overall, the patient is very well appearing. I do not feel that this patient would benefit from antibiotics at this time.   Zofran sent to preferred pharmacy for treatment of acute nausea.  Counseled patient to stick to a clear/bland diet to aid in bowel rest.  May advance as tolerated.  Recommended symptomatic and supportive care at this time that includes plenty of fluids, rest, Tylenol/Ibuprofen for pain/fever, warm salt water gargles  for sore throat, OTC cough and decongestant medication, Flonase, nasal saline washes.    Strict ER return precautions discussed.  Patient comfortable plan.         Differential diagnosis, natural history, supportive care, and indications for immediate follow-up discussed.    Advised the patient to follow-up with the primary care physician for recheck, reevaluation, and consideration of further management.    Please note that this dictation was created using voice recognition software. I have made a reasonable attempt to correct obvious errors, but I expect that there are errors of grammar and possibly content that I did not discover before finalizing the note.    This note was electronically signed by FLOYD Walden

## 2025-05-10 ENCOUNTER — HOSPITAL ENCOUNTER (EMERGENCY)
Facility: MEDICAL CENTER | Age: 55
End: 2025-05-10
Attending: EMERGENCY MEDICINE
Payer: OTHER GOVERNMENT

## 2025-05-10 VITALS
SYSTOLIC BLOOD PRESSURE: 138 MMHG | RESPIRATION RATE: 18 BRPM | WEIGHT: 122.8 LBS | DIASTOLIC BLOOD PRESSURE: 74 MMHG | BODY MASS INDEX: 24.11 KG/M2 | HEART RATE: 63 BPM | HEIGHT: 60 IN | OXYGEN SATURATION: 97 % | TEMPERATURE: 98.9 F

## 2025-05-10 DIAGNOSIS — J11.1 INFLUENZA: ICD-10-CM

## 2025-05-10 DIAGNOSIS — E86.0 DEHYDRATION: ICD-10-CM

## 2025-05-10 DIAGNOSIS — R11.2 NAUSEA AND VOMITING, UNSPECIFIED VOMITING TYPE: ICD-10-CM

## 2025-05-10 LAB
ALBUMIN SERPL BCP-MCNC: 4.5 G/DL (ref 3.2–4.9)
ALBUMIN/GLOB SERPL: 1.6 G/DL
ALP SERPL-CCNC: 82 U/L (ref 30–99)
ALT SERPL-CCNC: 64 U/L (ref 2–50)
ANION GAP SERPL CALC-SCNC: 19 MMOL/L (ref 7–16)
AST SERPL-CCNC: 52 U/L (ref 12–45)
BASOPHILS # BLD AUTO: 0.7 % (ref 0–1.8)
BASOPHILS # BLD: 0.03 K/UL (ref 0–0.12)
BILIRUB SERPL-MCNC: 0.6 MG/DL (ref 0.1–1.5)
BUN SERPL-MCNC: 12 MG/DL (ref 8–22)
CALCIUM ALBUM COR SERPL-MCNC: 8.9 MG/DL (ref 8.5–10.5)
CALCIUM SERPL-MCNC: 9.3 MG/DL (ref 8.5–10.5)
CHLORIDE SERPL-SCNC: 103 MMOL/L (ref 96–112)
CO2 SERPL-SCNC: 17 MMOL/L (ref 20–33)
CREAT SERPL-MCNC: 0.69 MG/DL (ref 0.5–1.4)
EOSINOPHIL # BLD AUTO: 0 K/UL (ref 0–0.51)
EOSINOPHIL NFR BLD: 0 % (ref 0–6.9)
ERYTHROCYTE [DISTWIDTH] IN BLOOD BY AUTOMATED COUNT: 40.7 FL (ref 35.9–50)
GFR SERPLBLD CREATININE-BSD FMLA CKD-EPI: 102 ML/MIN/1.73 M 2
GLOBULIN SER CALC-MCNC: 2.9 G/DL (ref 1.9–3.5)
GLUCOSE SERPL-MCNC: 81 MG/DL (ref 65–99)
HCT VFR BLD AUTO: 41.3 % (ref 37–47)
HGB BLD-MCNC: 13.7 G/DL (ref 12–16)
IMM GRANULOCYTES # BLD AUTO: 0.01 K/UL (ref 0–0.11)
IMM GRANULOCYTES NFR BLD AUTO: 0.2 % (ref 0–0.9)
LYMPHOCYTES # BLD AUTO: 0.69 K/UL (ref 1–4.8)
LYMPHOCYTES NFR BLD: 16.5 % (ref 22–41)
MCH RBC QN AUTO: 29.3 PG (ref 27–33)
MCHC RBC AUTO-ENTMCNC: 33.2 G/DL (ref 32.2–35.5)
MCV RBC AUTO: 88.4 FL (ref 81.4–97.8)
MONOCYTES # BLD AUTO: 0.48 K/UL (ref 0–0.85)
MONOCYTES NFR BLD AUTO: 11.5 % (ref 0–13.4)
NEUTROPHILS # BLD AUTO: 2.98 K/UL (ref 1.82–7.42)
NEUTROPHILS NFR BLD: 71.1 % (ref 44–72)
NRBC # BLD AUTO: 0 K/UL
NRBC BLD-RTO: 0 /100 WBC (ref 0–0.2)
PLATELET # BLD AUTO: 180 K/UL (ref 164–446)
PMV BLD AUTO: 11.2 FL (ref 9–12.9)
POTASSIUM SERPL-SCNC: 3.6 MMOL/L (ref 3.6–5.5)
PROT SERPL-MCNC: 7.4 G/DL (ref 6–8.2)
RBC # BLD AUTO: 4.67 M/UL (ref 4.2–5.4)
SODIUM SERPL-SCNC: 139 MMOL/L (ref 135–145)
WBC # BLD AUTO: 4.2 K/UL (ref 4.8–10.8)

## 2025-05-10 PROCEDURE — 700105 HCHG RX REV CODE 258: Performed by: EMERGENCY MEDICINE

## 2025-05-10 PROCEDURE — 96375 TX/PRO/DX INJ NEW DRUG ADDON: CPT

## 2025-05-10 PROCEDURE — 85025 COMPLETE CBC W/AUTO DIFF WBC: CPT

## 2025-05-10 PROCEDURE — 80053 COMPREHEN METABOLIC PANEL: CPT

## 2025-05-10 PROCEDURE — 700111 HCHG RX REV CODE 636 W/ 250 OVERRIDE (IP): Mod: JZ | Performed by: EMERGENCY MEDICINE

## 2025-05-10 PROCEDURE — 96374 THER/PROPH/DIAG INJ IV PUSH: CPT

## 2025-05-10 PROCEDURE — 96361 HYDRATE IV INFUSION ADD-ON: CPT

## 2025-05-10 PROCEDURE — 99284 EMERGENCY DEPT VISIT MOD MDM: CPT

## 2025-05-10 PROCEDURE — 36415 COLL VENOUS BLD VENIPUNCTURE: CPT

## 2025-05-10 RX ORDER — KETOROLAC TROMETHAMINE 15 MG/ML
15 INJECTION, SOLUTION INTRAMUSCULAR; INTRAVENOUS ONCE
Status: COMPLETED | OUTPATIENT
Start: 2025-05-10 | End: 2025-05-10

## 2025-05-10 RX ORDER — SODIUM CHLORIDE 9 MG/ML
1000 INJECTION, SOLUTION INTRAVENOUS ONCE
Status: COMPLETED | OUTPATIENT
Start: 2025-05-10 | End: 2025-05-10

## 2025-05-10 RX ORDER — ONDANSETRON 2 MG/ML
4 INJECTION INTRAMUSCULAR; INTRAVENOUS ONCE
Status: COMPLETED | OUTPATIENT
Start: 2025-05-10 | End: 2025-05-10

## 2025-05-10 RX ADMIN — KETOROLAC TROMETHAMINE 15 MG: 15 INJECTION, SOLUTION INTRAMUSCULAR; INTRAVENOUS at 09:53

## 2025-05-10 RX ADMIN — SODIUM CHLORIDE 1000 ML: 9 INJECTION, SOLUTION INTRAVENOUS at 09:53

## 2025-05-10 RX ADMIN — ONDANSETRON 4 MG: 2 INJECTION INTRAMUSCULAR; INTRAVENOUS at 09:53

## 2025-05-10 ASSESSMENT — FIBROSIS 4 INDEX: FIB4 SCORE: 0.72

## 2025-05-10 NOTE — ED NOTES
Pt ambulates to ED complaining of Flu symptoms after testing positive for Flu at urgent care. Pt states these symptoms started about a week ago. Pt states she can't keep down akash fluids.

## 2025-05-10 NOTE — ED PROVIDER NOTES
ED Provider Note    CHIEF COMPLAINT  Chief Complaint   Patient presents with    Flu Like Symptoms     Pt ambulates to ED complaining of Flu symptoms after testing positive for Flu at urgent care. Pt states these symptoms started about a week ago. Pt states she can't keep down akash fluids.     N/V       HPI/ROS    Елена Fowler is a 54 y.o. female who presents with flulike symptoms.  The patient's been sick for about a week.  She was recently diagnosed with influenza B.  The patient states she cannot keep anything down despite the Zofran.  She does have some diffuse myalgias as well as continued fevers.  She feels extremely dehydrated.  She is otherwise healthy.  She has not had any associated rashes.    PAST MEDICAL HISTORY   has a past medical history of Allergy, Anxiety, Depression, and GERD (gastroesophageal reflux disease).    SURGICAL HISTORY   has a past surgical history that includes abdominal hysterectomy total; eye surgery; and tubal coagulation laparoscopic bilateral.    FAMILY HISTORY  Family History   Problem Relation Age of Onset    Hypertension Father     Breast Cancer Maternal Aunt     Cancer Maternal Aunt     Stroke Maternal Aunt     Breast Cancer Maternal Aunt     Heart Disease Paternal Grandmother     Heart Disease Paternal Grandfather     Ovarian Cancer Neg Hx     Peritoneal Cancer Neg Hx     Tubal Cancer Neg Hx     Colorectal Cancer Neg Hx     Diabetes Neg Hx     Hyperlipidemia Neg Hx        SOCIAL HISTORY  Social History     Tobacco Use    Smoking status: Every Day     Types: Electronic Cigarettes    Smokeless tobacco: Never   Vaping Use    Vaping status: Never Used   Substance and Sexual Activity    Alcohol use: Yes     Alcohol/week: 0.6 oz     Types: 1 Glasses of wine per week     Comment: occ    Drug use: Yes     Frequency: 7.0 times per week     Types: Inhaled     Comment: the marijuana    Sexual activity: Not Currently     Partners: Female, Male       CURRENT MEDICATIONS  Home  Medications    **Home medications have not yet been reviewed for this encounter**         ALLERGIES  Allergies   Allergen Reactions    Benadryl Allergy      Other Reaction(s): Hyperactivity    Penicillins Anaphylaxis and Unspecified     Was told as a baby    Benadryl [Diphenhydramine]        PHYSICAL EXAM  VITAL SIGNS: BP (!) 140/85   Pulse 67   Temp 37.2 °C (98.9 °F) (Temporal)   Resp 20   Ht 1.524 m (5')   Wt 55.7 kg (122 lb 12.7 oz)   SpO2 97%   BMI 23.98 kg/m²    In general the patient appears ill    HEENT unremarkable except for dry mucous membranes    Pulmonary the patient's lungs are clear to auscultation bilaterally    Cardiovascular S1-S2 with a regular rate and rhythm    GI the patient's abdomen is soft with no distention    Skin no rashes, pallor, no jaundice    Extremities no distal edema    Neurologic examination is grossly intact    EKG/LABS  Results for orders placed or performed during the hospital encounter of 05/10/25   COMP METABOLIC PANEL    Collection Time: 05/10/25  9:30 AM   Result Value Ref Range    Sodium 139 135 - 145 mmol/L    Potassium 3.6 3.6 - 5.5 mmol/L    Chloride 103 96 - 112 mmol/L    Co2 17 (L) 20 - 33 mmol/L    Anion Gap 19.0 (H) 7.0 - 16.0    Glucose 81 65 - 99 mg/dL    Bun 12 8 - 22 mg/dL    Creatinine 0.69 0.50 - 1.40 mg/dL    Calcium 9.3 8.5 - 10.5 mg/dL    Correct Calcium 8.9 8.5 - 10.5 mg/dL    AST(SGOT) 52 (H) 12 - 45 U/L    ALT(SGPT) 64 (H) 2 - 50 U/L    Alkaline Phosphatase 82 30 - 99 U/L    Total Bilirubin 0.6 0.1 - 1.5 mg/dL    Albumin 4.5 3.2 - 4.9 g/dL    Total Protein 7.4 6.0 - 8.2 g/dL    Globulin 2.9 1.9 - 3.5 g/dL    A-G Ratio 1.6 g/dL   ESTIMATED GFR    Collection Time: 05/10/25  9:30 AM   Result Value Ref Range    GFR (CKD-EPI) 102 >60 mL/min/1.73 m 2   CBC WITH DIFFERENTIAL    Collection Time: 05/10/25 10:22 AM   Result Value Ref Range    WBC 4.2 (L) 4.8 - 10.8 K/uL    RBC 4.67 4.20 - 5.40 M/uL    Hemoglobin 13.7 12.0 - 16.0 g/dL    Hematocrit 41.3 37.0  - 47.0 %    MCV 88.4 81.4 - 97.8 fL    MCH 29.3 27.0 - 33.0 pg    MCHC 33.2 32.2 - 35.5 g/dL    RDW 40.7 35.9 - 50.0 fL    Platelet Count 180 164 - 446 K/uL    MPV 11.2 9.0 - 12.9 fL    Neutrophils-Polys 71.10 44.00 - 72.00 %    Lymphocytes 16.50 (L) 22.00 - 41.00 %    Monocytes 11.50 0.00 - 13.40 %    Eosinophils 0.00 0.00 - 6.90 %    Basophils 0.70 0.00 - 1.80 %    Immature Granulocytes 0.20 0.00 - 0.90 %    Nucleated RBC 0.00 0.00 - 0.20 /100 WBC    Neutrophils (Absolute) 2.98 1.82 - 7.42 K/uL    Lymphs (Absolute) 0.69 (L) 1.00 - 4.80 K/uL    Monos (Absolute) 0.48 0.00 - 0.85 K/uL    Eos (Absolute) 0.00 0.00 - 0.51 K/uL    Baso (Absolute) 0.03 0.00 - 0.12 K/uL    Immature Granulocytes (abs) 0.01 0.00 - 0.11 K/uL    NRBC (Absolute) 0.00 K/uL       COURSE & MEDICAL DECISION MAKING    This is a 54-year-old female who presents the emergency department with flulike symptoms.  She is been unable to keep anything down and clinically appeared dehydrated.  Therefore an IV was established.  She does have a mild acidosis consistent with dehydration.  She did respond to a liter of fluid intravenously and feels significantly better on repeat exam.  She also received Zofran and has had no further emesis.  Her abdomen is nonsurgical.  The patient be discharged home with instructions for continued supportive management.  She does have Zofran at home.        FINAL DIAGNOSIS  1.  Influenza  2.  Nausea and vomiting  3.  Dehydration requiring IV hydration    Disposition  The patient will be discharged in stable condition       Electronically signed by: Chito Delcid M.D., 5/10/2025 9:47 AM

## 2025-05-10 NOTE — ED NOTES
Dr Delcid presents to the bedside to engage in cyndy conversation regarding the patient's reason for presenting to the ED and POC.

## 2025-05-10 NOTE — ED TRIAGE NOTES
Chief Complaint   Patient presents with    Flu Like Symptoms     Pt ambulates to ED complaining of Flu symptoms after testing positive for Flu at urgent care. Pt states these symptoms started about a week ago. Pt states she can't keep down akash fluids.     N/V     Blood Pressure: (!) 140/85, Pulse: 67, Respiration: 20, Temperature: 37.2 °C (98.9 °F), Height: 152.4 cm (5'), Weight: 55.7 kg (122 lb 12.7 oz), BMI (Calculated): 23.98, BSA (Calculated): 1.5, Pulse Oximetry: 97 %, O2 Delivery Device: None - Room Air

## 2025-05-11 ENCOUNTER — HOSPITAL ENCOUNTER (EMERGENCY)
Facility: MEDICAL CENTER | Age: 55
End: 2025-05-11
Attending: EMERGENCY MEDICINE
Payer: OTHER GOVERNMENT

## 2025-05-11 VITALS
SYSTOLIC BLOOD PRESSURE: 141 MMHG | DIASTOLIC BLOOD PRESSURE: 73 MMHG | HEIGHT: 60 IN | HEART RATE: 86 BPM | TEMPERATURE: 98.2 F | OXYGEN SATURATION: 98 % | RESPIRATION RATE: 18 BRPM | BODY MASS INDEX: 23.81 KG/M2 | WEIGHT: 121.25 LBS

## 2025-05-11 DIAGNOSIS — K52.9 GASTROENTERITIS: ICD-10-CM

## 2025-05-11 DIAGNOSIS — R11.2 NAUSEA AND VOMITING, UNSPECIFIED VOMITING TYPE: ICD-10-CM

## 2025-05-11 LAB
ALBUMIN SERPL BCP-MCNC: 4.6 G/DL (ref 3.2–4.9)
ALBUMIN/GLOB SERPL: 1.5 G/DL
ALP SERPL-CCNC: 83 U/L (ref 30–99)
ALT SERPL-CCNC: 57 U/L (ref 2–50)
ANION GAP SERPL CALC-SCNC: 19 MMOL/L (ref 7–16)
APPEARANCE UR: ABNORMAL
AST SERPL-CCNC: 46 U/L (ref 12–45)
BACTERIA #/AREA URNS HPF: ABNORMAL /HPF
BASOPHILS # BLD AUTO: 0.5 % (ref 0–1.8)
BASOPHILS # BLD: 0.02 K/UL (ref 0–0.12)
BILIRUB SERPL-MCNC: 0.7 MG/DL (ref 0.1–1.5)
BILIRUB UR QL STRIP.AUTO: NEGATIVE
BUN SERPL-MCNC: 9 MG/DL (ref 8–22)
CALCIUM ALBUM COR SERPL-MCNC: 8.8 MG/DL (ref 8.5–10.5)
CALCIUM SERPL-MCNC: 9.3 MG/DL (ref 8.5–10.5)
CASTS URNS QL MICRO: ABNORMAL /LPF (ref 0–2)
CHLORIDE SERPL-SCNC: 103 MMOL/L (ref 96–112)
CO2 SERPL-SCNC: 17 MMOL/L (ref 20–33)
COLOR UR: YELLOW
CREAT SERPL-MCNC: 0.63 MG/DL (ref 0.5–1.4)
EOSINOPHIL # BLD AUTO: 0 K/UL (ref 0–0.51)
EOSINOPHIL NFR BLD: 0 % (ref 0–6.9)
EPITHELIAL CELLS 1715: ABNORMAL /HPF (ref 0–5)
ERYTHROCYTE [DISTWIDTH] IN BLOOD BY AUTOMATED COUNT: 39.7 FL (ref 35.9–50)
GFR SERPLBLD CREATININE-BSD FMLA CKD-EPI: 104 ML/MIN/1.73 M 2
GLOBULIN SER CALC-MCNC: 3.1 G/DL (ref 1.9–3.5)
GLUCOSE SERPL-MCNC: 76 MG/DL (ref 65–99)
GLUCOSE UR STRIP.AUTO-MCNC: NEGATIVE MG/DL
HCT VFR BLD AUTO: 48 % (ref 37–47)
HGB BLD-MCNC: 16.1 G/DL (ref 12–16)
IMM GRANULOCYTES # BLD AUTO: 0.01 K/UL (ref 0–0.11)
IMM GRANULOCYTES NFR BLD AUTO: 0.2 % (ref 0–0.9)
KETONES UR STRIP.AUTO-MCNC: >=160 MG/DL
LEUKOCYTE ESTERASE UR QL STRIP.AUTO: NEGATIVE
LIPASE SERPL-CCNC: 37 U/L (ref 11–82)
LYMPHOCYTES # BLD AUTO: 1.32 K/UL (ref 1–4.8)
LYMPHOCYTES NFR BLD: 31.3 % (ref 22–41)
MCH RBC QN AUTO: 29.1 PG (ref 27–33)
MCHC RBC AUTO-ENTMCNC: 33.5 G/DL (ref 32.2–35.5)
MCV RBC AUTO: 86.8 FL (ref 81.4–97.8)
MICRO URNS: ABNORMAL
MONOCYTES # BLD AUTO: 0.35 K/UL (ref 0–0.85)
MONOCYTES NFR BLD AUTO: 8.3 % (ref 0–13.4)
NEUTROPHILS # BLD AUTO: 2.52 K/UL (ref 1.82–7.42)
NEUTROPHILS NFR BLD: 59.7 % (ref 44–72)
NITRITE UR QL STRIP.AUTO: NEGATIVE
NRBC # BLD AUTO: 0 K/UL
NRBC BLD-RTO: 0 /100 WBC (ref 0–0.2)
PH UR STRIP.AUTO: 5.5 [PH] (ref 5–8)
PLATELET # BLD AUTO: 210 K/UL (ref 164–446)
PMV BLD AUTO: 11.4 FL (ref 9–12.9)
POTASSIUM SERPL-SCNC: 3.1 MMOL/L (ref 3.6–5.5)
PROT SERPL-MCNC: 7.7 G/DL (ref 6–8.2)
PROT UR QL STRIP: 30 MG/DL
RBC # BLD AUTO: 5.53 M/UL (ref 4.2–5.4)
RBC # URNS HPF: ABNORMAL /HPF
RBC UR QL AUTO: NEGATIVE
SODIUM SERPL-SCNC: 139 MMOL/L (ref 135–145)
SP GR UR STRIP.AUTO: 1.03
UROBILINOGEN UR STRIP.AUTO-MCNC: 1 EU/DL
WBC # BLD AUTO: 4.2 K/UL (ref 4.8–10.8)
WBC #/AREA URNS HPF: ABNORMAL /HPF

## 2025-05-11 PROCEDURE — 81001 URINALYSIS AUTO W/SCOPE: CPT

## 2025-05-11 PROCEDURE — 80053 COMPREHEN METABOLIC PANEL: CPT

## 2025-05-11 PROCEDURE — 700105 HCHG RX REV CODE 258: Performed by: EMERGENCY MEDICINE

## 2025-05-11 PROCEDURE — 36415 COLL VENOUS BLD VENIPUNCTURE: CPT

## 2025-05-11 PROCEDURE — 83690 ASSAY OF LIPASE: CPT

## 2025-05-11 PROCEDURE — 99284 EMERGENCY DEPT VISIT MOD MDM: CPT

## 2025-05-11 PROCEDURE — 700111 HCHG RX REV CODE 636 W/ 250 OVERRIDE (IP): Mod: JZ | Performed by: EMERGENCY MEDICINE

## 2025-05-11 PROCEDURE — 96376 TX/PRO/DX INJ SAME DRUG ADON: CPT

## 2025-05-11 PROCEDURE — 96375 TX/PRO/DX INJ NEW DRUG ADDON: CPT

## 2025-05-11 PROCEDURE — 96374 THER/PROPH/DIAG INJ IV PUSH: CPT

## 2025-05-11 PROCEDURE — 85025 COMPLETE CBC W/AUTO DIFF WBC: CPT

## 2025-05-11 RX ORDER — PROCHLORPERAZINE EDISYLATE 5 MG/ML
10 INJECTION INTRAMUSCULAR; INTRAVENOUS ONCE
Status: DISCONTINUED | OUTPATIENT
Start: 2025-05-11 | End: 2025-05-11

## 2025-05-11 RX ORDER — PROMETHAZINE HYDROCHLORIDE 25 MG/1
25 SUPPOSITORY RECTAL EVERY 6 HOURS PRN
Qty: 10 SUPPOSITORY | Refills: 0 | Status: SHIPPED | OUTPATIENT
Start: 2025-05-11 | End: 2025-05-27

## 2025-05-11 RX ORDER — PROCHLORPERAZINE EDISYLATE 5 MG/ML
10 INJECTION INTRAMUSCULAR; INTRAVENOUS ONCE
Status: COMPLETED | OUTPATIENT
Start: 2025-05-11 | End: 2025-05-11

## 2025-05-11 RX ORDER — SODIUM CHLORIDE, SODIUM LACTATE, POTASSIUM CHLORIDE, AND CALCIUM CHLORIDE .6; .31; .03; .02 G/100ML; G/100ML; G/100ML; G/100ML
1000 INJECTION, SOLUTION INTRAVENOUS ONCE
Status: COMPLETED | OUTPATIENT
Start: 2025-05-11 | End: 2025-05-11

## 2025-05-11 RX ORDER — DIAZEPAM 10 MG/2ML
2.5 INJECTION, SOLUTION INTRAMUSCULAR; INTRAVENOUS ONCE
Status: COMPLETED | OUTPATIENT
Start: 2025-05-11 | End: 2025-05-11

## 2025-05-11 RX ADMIN — DIAZEPAM 2.5 MG: 5 INJECTION, SOLUTION INTRAMUSCULAR; INTRAVENOUS at 13:29

## 2025-05-11 RX ADMIN — DIAZEPAM 2.5 MG: 5 INJECTION, SOLUTION INTRAMUSCULAR; INTRAVENOUS at 12:25

## 2025-05-11 RX ADMIN — SODIUM CHLORIDE, POTASSIUM CHLORIDE, SODIUM LACTATE AND CALCIUM CHLORIDE 1000 ML: 600; 310; 30; 20 INJECTION, SOLUTION INTRAVENOUS at 12:00

## 2025-05-11 RX ADMIN — PROCHLORPERAZINE EDISYLATE 10 MG: 5 INJECTION INTRAMUSCULAR; INTRAVENOUS at 12:04

## 2025-05-11 ASSESSMENT — FIBROSIS 4 INDEX: FIB4 SCORE: 2.02

## 2025-05-11 NOTE — ED NOTES
ERP at bedside. Pt agrees with plan of care discussed by ERP. Milly in low position, side rail up for pt safety. Call light within reach. Plan of care on-going

## 2025-05-11 NOTE — ED PROVIDER NOTES
ED Provider Note    CHIEF COMPLAINT  Chief Complaint   Patient presents with    N/V     C/o ongoing N/V s/p DX Influenza B  Seen yesterday for c/o same           HPI/ROS      Елена Fowler is a 54 y.o. female who presents with chief complaint of nausea and vomiting.  Patient seen here yesterday for flulike symptoms.  She was recently diagnosed with flu B.  Patient had basic labs checked here which showed findings consistent with dehydration, but were otherwise reassuring.  White count was just below normal and patient was discharged home with supportive care.    PAST MEDICAL HISTORY   has a past medical history of Allergy, Anxiety, Depression, and GERD (gastroesophageal reflux disease).    SURGICAL HISTORY   has a past surgical history that includes abdominal hysterectomy total; eye surgery; and tubal coagulation laparoscopic bilateral.    FAMILY HISTORY  Family History   Problem Relation Age of Onset    Hypertension Father     Breast Cancer Maternal Aunt     Cancer Maternal Aunt     Stroke Maternal Aunt     Breast Cancer Maternal Aunt     Heart Disease Paternal Grandmother     Heart Disease Paternal Grandfather     Ovarian Cancer Neg Hx     Peritoneal Cancer Neg Hx     Tubal Cancer Neg Hx     Colorectal Cancer Neg Hx     Diabetes Neg Hx     Hyperlipidemia Neg Hx        SOCIAL HISTORY  Social History     Tobacco Use    Smoking status: Every Day     Types: Electronic Cigarettes    Smokeless tobacco: Never   Vaping Use    Vaping status: Never Used   Substance and Sexual Activity    Alcohol use: Yes     Alcohol/week: 0.6 oz     Types: 1 Glasses of wine per week     Comment: occ    Drug use: Yes     Frequency: 7.0 times per week     Types: Inhaled     Comment: the marijuana    Sexual activity: Not Currently     Partners: Female, Male       CURRENT MEDICATIONS  Home Medications    **Home medications have not yet been reviewed for this encounter**         ALLERGIES  Allergies   Allergen Reactions    Benadryl  Allergy      Other Reaction(s): Hyperactivity    Penicillins Anaphylaxis and Unspecified     Was told as a baby    Benadryl [Diphenhydramine]        PHYSICAL EXAM  VITAL SIGNS: BP (!) 154/88   Pulse (!) 57   Temp 36.8 °C (98.2 °F) (Temporal)   Resp 20   Ht 1.524 m (5')   Wt 55 kg (121 lb 4.1 oz)   SpO2 97%   BMI 23.68 kg/m²    Physical Exam  Constitutional:       Appearance: She is well-developed.   HENT:      Head: Normocephalic and atraumatic.   Eyes:      Pupils: Pupils are equal, round, and reactive to light.   Cardiovascular:      Rate and Rhythm: Normal rate and regular rhythm.   Pulmonary:      Effort: Pulmonary effort is normal. No accessory muscle usage or respiratory distress.      Breath sounds: Normal breath sounds.   Abdominal:      General: Bowel sounds are normal.      Palpations: Abdomen is soft. There is no mass.      Tenderness: There is no abdominal tenderness.   Musculoskeletal:         General: Normal range of motion.   Skin:     General: Skin is warm.      Capillary Refill: Capillary refill takes less than 2 seconds.   Neurological:      General: No focal deficit present.      Mental Status: She is alert.   Psychiatric:         Mood and Affect: Mood normal. Mood is not anxious.           EKG/LABS  Results for orders placed or performed during the hospital encounter of 05/11/25   CBC with Differential    Collection Time: 05/11/25 10:12 AM   Result Value Ref Range    WBC 4.2 (L) 4.8 - 10.8 K/uL    RBC 5.53 (H) 4.20 - 5.40 M/uL    Hemoglobin 16.1 (H) 12.0 - 16.0 g/dL    Hematocrit 48.0 (H) 37.0 - 47.0 %    MCV 86.8 81.4 - 97.8 fL    MCH 29.1 27.0 - 33.0 pg    MCHC 33.5 32.2 - 35.5 g/dL    RDW 39.7 35.9 - 50.0 fL    Platelet Count 210 164 - 446 K/uL    MPV 11.4 9.0 - 12.9 fL    Neutrophils-Polys 59.70 44.00 - 72.00 %    Lymphocytes 31.30 22.00 - 41.00 %    Monocytes 8.30 0.00 - 13.40 %    Eosinophils 0.00 0.00 - 6.90 %    Basophils 0.50 0.00 - 1.80 %    Immature Granulocytes 0.20 0.00 - 0.90  %    Nucleated RBC 0.00 0.00 - 0.20 /100 WBC    Neutrophils (Absolute) 2.52 1.82 - 7.42 K/uL    Lymphs (Absolute) 1.32 1.00 - 4.80 K/uL    Monos (Absolute) 0.35 0.00 - 0.85 K/uL    Eos (Absolute) 0.00 0.00 - 0.51 K/uL    Baso (Absolute) 0.02 0.00 - 0.12 K/uL    Immature Granulocytes (abs) 0.01 0.00 - 0.11 K/uL    NRBC (Absolute) 0.00 K/uL   Complete Metabolic Panel    Collection Time: 05/11/25 10:12 AM   Result Value Ref Range    Sodium 139 135 - 145 mmol/L    Potassium 3.1 (L) 3.6 - 5.5 mmol/L    Chloride 103 96 - 112 mmol/L    Co2 17 (L) 20 - 33 mmol/L    Anion Gap 19.0 (H) 7.0 - 16.0    Glucose 76 65 - 99 mg/dL    Bun 9 8 - 22 mg/dL    Creatinine 0.63 0.50 - 1.40 mg/dL    Calcium 9.3 8.5 - 10.5 mg/dL    Correct Calcium 8.8 8.5 - 10.5 mg/dL    AST(SGOT) 46 (H) 12 - 45 U/L    ALT(SGPT) 57 (H) 2 - 50 U/L    Alkaline Phosphatase 83 30 - 99 U/L    Total Bilirubin 0.7 0.1 - 1.5 mg/dL    Albumin 4.6 3.2 - 4.9 g/dL    Total Protein 7.7 6.0 - 8.2 g/dL    Globulin 3.1 1.9 - 3.5 g/dL    A-G Ratio 1.5 g/dL   Lipase    Collection Time: 05/11/25 10:12 AM   Result Value Ref Range    Lipase 37 11 - 82 U/L   ESTIMATED GFR    Collection Time: 05/11/25 10:12 AM   Result Value Ref Range    GFR (CKD-EPI) 104 >60 mL/min/1.73 m 2   Urinalysis    Collection Time: 05/11/25 12:45 PM    Specimen: Urine   Result Value Ref Range    Color Yellow     Character Cloudy (A)     Specific Gravity 1.027 <1.035    Ph 5.5 5.0 - 8.0    Glucose Negative Negative mg/dL    Ketones >=160 Negative mg/dL    Protein 30 (A) Negative mg/dL    Bilirubin Negative Negative    Urobilinogen, Urine 1.0 <=1.0 EU/dL    Nitrite Negative Negative    Leukocyte Esterase Negative Negative    Occult Blood Negative Negative    Micro Urine Req Microscopic    URINE MICROSCOPIC (W/UA)    Collection Time: 05/11/25 12:45 PM   Result Value Ref Range    WBC 0-2 /hpf    RBC 0-2 /hpf    Bacteria Few (A) None /hpf    Epithelial Cells 3-5 0 - 5 /hpf    Urine Casts 0-2 0 - 2 /lpf       I  have independently interpreted this EKG      COURSE & MEDICAL DECISION MAKING    ASSESSMENT, COURSE AND PLAN  Care Narrative: Patient here with ongoing vomiting in the setting of recent flu. Labs checked to risk stratify.  Labs are consistent with ongoing dehydration.  She has very reassuring exam otherwise.  Will give IV Compazine.  Patient does appear dehydrated labs are consistent with this therefore will give IV fluids as well will p.o. challenge.  At this point was suspicion of surgical pathology remains very very low, patient with normal white count, no reproducible pain on exam.  Following Compazine patient    Hydration: Based on the patient's presentation of Abnormal Fluid Loss, Acute Vomiting, and Dehydration the patient was given IV fluids. IV Hydration was used because oral hydration was not adequate alone. Upon recheck following hydration, the patient was improved.    Nausea and vomiting significantly improved though she is feeling akathetic and mildly anxious.  She has a allergy to Benadryl.  She was given some Valium to help with her symptoms.    Following Valium patient is improved but the anxiety did recur so she received a second dose.  Patient has not vomited.  She is able to eat and drink.  Home with ongoing Zofran and I have added HI promethazine.  Return precautions reviewed          DISPOSITION AND DISCUSSIONS    Escalation of care considered, and ultimately not performed: Imaging deferred including CT given patient very well-appearing with reassuring exam, very low clinical suspicion of surgical pathology        FINAL DIAGNOSIS  1. Nausea and vomiting, unspecified vomiting type  promethazine (PHENERGAN) 25 MG Suppos      2. Gastroenteritis  promethazine (PHENERGAN) 25 MG Suppos

## 2025-05-11 NOTE — ED NOTES
Pt having severe anxiety from compazine administration. ERP aware new orders received. Pt to be medicated by RN

## 2025-05-11 NOTE — ED NOTES
Pt ambulates to the ED with same symptoms that she arrived here for yesterday. Pt describes inability to keep down fluids and food due to nausea.

## 2025-05-11 NOTE — ED TRIAGE NOTES
Chief Complaint   Patient presents with    N/V     C/o ongoing N/V s/p DX Influenza B  Seen yesterday for c/o same     BP (!) 154/88   Pulse (!) 57   Temp 36.8 °C (98.2 °F) (Temporal)   Resp 20   Ht 1.524 m (5')   Wt 55 kg (121 lb 4.1 oz)   SpO2 97%   BMI 23.68 kg/m²     Pt ambulated to ED w/ visitor for c/o ongoing N/V.

## 2025-05-11 NOTE — DISCHARGE INSTRUCTIONS
Continue taking your Zofran, you can also take the promethazine suppositories I have sent you home with.  Try to drink plenty of fluids.  I would schedule the promethazine take this every 6 hours for the next 24 to 48 hours

## 2025-05-17 ENCOUNTER — HOSPITAL ENCOUNTER (EMERGENCY)
Facility: MEDICAL CENTER | Age: 55
End: 2025-05-17
Attending: EMERGENCY MEDICINE
Payer: OTHER GOVERNMENT

## 2025-05-17 ENCOUNTER — PHARMACY VISIT (OUTPATIENT)
Dept: PHARMACY | Facility: MEDICAL CENTER | Age: 55
End: 2025-05-17
Payer: COMMERCIAL

## 2025-05-17 VITALS
WEIGHT: 122 LBS | RESPIRATION RATE: 16 BRPM | TEMPERATURE: 98 F | OXYGEN SATURATION: 98 % | HEIGHT: 60 IN | HEART RATE: 72 BPM | SYSTOLIC BLOOD PRESSURE: 130 MMHG | BODY MASS INDEX: 23.95 KG/M2 | DIASTOLIC BLOOD PRESSURE: 65 MMHG

## 2025-05-17 DIAGNOSIS — F41.9 ANXIETY: Primary | ICD-10-CM

## 2025-05-17 DIAGNOSIS — F41.0 PANIC ATTACK: ICD-10-CM

## 2025-05-17 PROCEDURE — RXMED WILLOW AMBULATORY MEDICATION CHARGE: Performed by: EMERGENCY MEDICINE

## 2025-05-17 PROCEDURE — 99284 EMERGENCY DEPT VISIT MOD MDM: CPT

## 2025-05-17 PROCEDURE — 700102 HCHG RX REV CODE 250 W/ 637 OVERRIDE(OP): Performed by: EMERGENCY MEDICINE

## 2025-05-17 PROCEDURE — 700111 HCHG RX REV CODE 636 W/ 250 OVERRIDE (IP): Performed by: EMERGENCY MEDICINE

## 2025-05-17 PROCEDURE — 96372 THER/PROPH/DIAG INJ SC/IM: CPT

## 2025-05-17 PROCEDURE — A9270 NON-COVERED ITEM OR SERVICE: HCPCS | Performed by: EMERGENCY MEDICINE

## 2025-05-17 RX ORDER — HYDROXYZINE HYDROCHLORIDE 25 MG/1
25 TABLET, FILM COATED ORAL 3 TIMES DAILY PRN
Qty: 30 TABLET | Refills: 1 | Status: SHIPPED | OUTPATIENT
Start: 2025-05-17 | End: 2025-05-27 | Stop reason: SDUPTHER

## 2025-05-17 RX ORDER — HYDROXYZINE HYDROCHLORIDE 25 MG/1
25 TABLET, FILM COATED ORAL ONCE
Status: COMPLETED | OUTPATIENT
Start: 2025-05-17 | End: 2025-05-17

## 2025-05-17 RX ORDER — DIAZEPAM 10 MG/2ML
5 INJECTION, SOLUTION INTRAMUSCULAR; INTRAVENOUS ONCE
Status: COMPLETED | OUTPATIENT
Start: 2025-05-17 | End: 2025-05-17

## 2025-05-17 RX ADMIN — HYDROXYZINE HYDROCHLORIDE 25 MG: 25 TABLET, FILM COATED ORAL at 16:09

## 2025-05-17 RX ADMIN — DIAZEPAM 5 MG: 10 INJECTION, SOLUTION INTRAMUSCULAR; INTRAVENOUS at 16:08

## 2025-05-17 ASSESSMENT — FIBROSIS 4 INDEX: FIB4 SCORE: 1.62

## 2025-05-17 ASSESSMENT — PAIN DESCRIPTION - PAIN TYPE: TYPE: ACUTE PAIN

## 2025-05-17 NOTE — ED PROVIDER NOTES
"ER Provider Note    Scribed for José Antonio Dia M.D. by Radha López. 5/17/2025  3:41 PM    Primary Care Provider: FLOYD Rajan    CHIEF COMPLAINT  Chief Complaint   Patient presents with    Anxiety     Pt states she has been in and out of hospitals due to different medical reasons and states she has become more anxious because of it. Pt states she \"took something this morning\" to help with her anxiety but it made her worse.     EXTERNAL RECORDS REVIEWED  Outpatient Notes Reviewed Urgent Care note from 5/9/25. Patient was seen for flu like symptoms.    HPI/ROS  LIMITATION TO HISTORY   Select: : None    OUTSIDE HISTORIAN(S):  None    Елена Fowler is a 54 y.o. female who presents to the ED complaining of anxiety onset several days ago.  states she was seen last night at the [facility] ER and was prescribed anxiety medication after several visits. He reports she feels like \"something is crawling out of her skin.\" He notes patient had the flu recently. He describes her anxiety has worsened due to going to the ER and being given medications for the flu and anxiety. He states she is starting to see a therapist, but has not been able to see her PCP due to her being booked out several months. He adds she was given Ativan at her recent visits with relief.     PAST MEDICAL HISTORY  Past Medical History[1]    SURGICAL HISTORY  Past Surgical History[2]    FAMILY HISTORY  Family History   Problem Relation Age of Onset    Hypertension Father     Breast Cancer Maternal Aunt     Cancer Maternal Aunt     Stroke Maternal Aunt     Breast Cancer Maternal Aunt     Heart Disease Paternal Grandmother     Heart Disease Paternal Grandfather     Ovarian Cancer Neg Hx     Peritoneal Cancer Neg Hx     Tubal Cancer Neg Hx     Colorectal Cancer Neg Hx     Diabetes Neg Hx     Hyperlipidemia Neg Hx        SOCIAL HISTORY   reports that she has been smoking electronic cigarettes. She has never used smokeless " tobacco. She reports current alcohol use of about 0.6 oz of alcohol per week. She reports current drug use. Frequency: 7.00 times per week. Drug: Inhaled.    CURRENT MEDICATIONS  Discharge Medication List as of 5/17/2025  4:03 PM        CONTINUE these medications which have NOT CHANGED    Details   promethazine (PHENERGAN) 25 MG Suppos Insert 1 Suppository into the rectum every 6 hours as needed for Nausea/Vomiting., Disp-10 Suppository, R-0, Normal      fosfomycin (MONUROL) 3 GM Pack MIX WITH 3-4 OUNCES OF WATER, STIR UNTIL COMPLETELY DISSOLVE, Historical Med      ondansetron (ZOFRAN ODT) 4 MG TABLET DISPERSIBLE Take 1 Tablet by mouth every 6 hours as needed for Nausea/Vomiting for up to 15 doses., Disp-15 Tablet, R-0, Print Rx Paper      phenazopyridine (PYRIDIUM) 200 MG Tab Take 1 Tablet by mouth 3 times a day as needed for Mild Pain., Disp-6 Tablet, R-0, Normal      estradiol (ESTRACE) 0.1 MG/GM vaginal cream Insert 1 g into the vagina every day for 14 days, THEN 1 g two times a week for 30 days., Disp-42.5 g, R-4, Normal      oxybutynin SR (DITROPAN-XL) 10 MG CR tablet Take 1 Tablet by mouth every day., Disp-30 Tablet, R-0, Normal      valACYclovir (VALTREX) 500 MG Tab Take 1 Tablet by mouth 2 times a day. Upon onset of prodrome take 500 mg twice daily for 3 days. 3/3/25 FOLLOW UP REQUIRED FOR FUTURE REFILL., Disp-40 Tablet, R-0, Normal      pantoprazole (PROTONIX) 40 MG Tablet Delayed Response Historical Med      dicyclomine (BENTYL) 10 MG Cap Take 1 Capsule by mouth 4 Times a Day,Before Meals and at Bedtime., Disp-120 Capsule, R-1, Normal      sucralfate (CARAFATE) 1 GM Tab Take 1 Tablet by mouth 4 Times a Day,Before Meals and at Bedtime., Disp-120 Tablet, R-3, Normal      omeprazole (PRILOSEC) 20 MG delayed-release capsule Take 1 Capsule by mouth every day., Disp-30 Capsule, R-1, Normal      famotidine (PEPCID) 20 MG Tab Take 1 Tablet by mouth 2 times a day., Disp-60 Tablet, R-1, Normal      ergocalciferol  (DRISDOL) 98244 UNIT capsule Take 1 Capsule by mouth every 7 days., Disp-12 Capsule, R-0, Normal             ALLERGIES  Benadryl allergy, Penicillins, and Benadryl [diphenhydramine]    PHYSICAL EXAM  VITAL SIGNS: BP (!) 149/94   Pulse 77   Temp 36.6 °C (97.9 °F) (Temporal)   Resp 20   Ht 1.524 m (5')   Wt 55.3 kg (122 lb)   SpO2 98%   BMI 23.83 kg/m²   Pulse ox interpretation: I interpret this pulse ox as normal.  Constitutional: Alert in obvious distress from anxiety.  HENT: No signs of trauma, Bilateral external ears normal, Nose normal.   Eyes: Pupils are equal and reactive, Conjunctiva normal, Non-icteric.   Neck: Normal range of motion, Supple, No stridor.    Cardiovascular: Normal peripheral perfusion  Thorax & Lungs: Unlabored respirations, equal chest expansion, no accessory muscle use  Abdomen: Non-distended  Skin:  No erythema, No rash.   Back: Normal alignment and ROM  Extremities: No gross deformity  Musculoskeletal: Good range of motion in all major joints.   Neurologic: Alert, Normal motor function, No focal deficits noted.   Psychiatric: Obvious distress from anxiety, Carpal pedal spasms from hyperventilating.          COURSE & MEDICAL DECISION MAKING     INITIAL ASSESSMENT, COURSE AND PLAN  Care Narrative:     3:41 PM Patient presents to the ED with anxiety. Patient evaluated at bedside and discussed plan of care, including Valium and Atarax medication here.  I informed patient that the SSRI medication she was prescribed will take about 4 weeks to start working. I reassured her that the medication is not making her anxiety worse. I advised for patient to follow up with therapist for treatment. I discussed plan for discharge and follow up as outlined below. The patient is stable for discharge at this time and will return for any new or worsening symptoms. Patient verbalizes understanding and support with my plan for discharge.      4:15 PM patient discharged improved condition after medication  with Valium injection, oral Atarax.  Will be prescribed a course of oral Atarax to facilitate self-medication at home, as needed, until her new SSRI medication, just started yesterday, starts helping.  She has been seeing a counselor, and I recommended increasing from every other week to weekly if possible, and have also placed a psychiatry referral for help with medication management.    ADDITIONAL PROBLEM MANAGED  Chronic anxiety as well as today's acute presentation.    DISPOSITION AND DISCUSSIONS  I have discussed management of the patient with the following physicians and ANIBAL's:  None    Discussion of management with other [facility] or appropriate source(s): None     Escalation of care considered, and ultimately not performed: acute inpatient care management, however at this time, the patient is most appropriate for outpatient management.    Barriers to care at this time, including but not limited to: None.     Decision tools and prescription drugs considered including, but not limited to: Medication modification was performed, to prescribe a short course of Atarax for as needed use.    The patient will return for new or worsening symptoms and is stable at the time of discharge.    The patient is referred to a primary physician for blood pressure management, diabetic screening, and for all other preventative health concerns.    DISPOSITION:  Patient will be discharged home in stable condition.    FOLLOW UP:  [facility] [address]  [facility] [address]  [phone]  Schedule an appointment as soon as possible for a visit       FINAL DIAGNOSIS  1. Anxiety    2. Panic attack       Radha GONCALVES (Luciano), am scribing for, and in the presence of, José Antonio Dia M.D..    Electronically signed by: Radha Calle), 5/17/2025    José Antonio GONCALVES M.D. personally performed the services described in this documentation, as scribed by Radha López in my presence, and it is both accurate and  complete.            [1]   Past Medical History:  Diagnosis Date    Allergy     Anxiety     Depression     GERD (gastroesophageal reflux disease)    [2]   Past Surgical History:  Procedure Laterality Date    ABDOMINAL HYSTERECTOMY TOTAL      EYE SURGERY      TUBAL COAGULATION LAPAROSCOPIC BILATERAL

## 2025-05-17 NOTE — ED TRIAGE NOTES
"Chief Complaint   Patient presents with    Anxiety     Pt states she has been in and out of hospitals due to different medical reasons and states she has become more anxious because of it. Pt states she \"took something this morning\" to help with her anxiety but it made her worse.     Pt wheeled to triage for above complaint, A+O x 4, GCS 15. Calm and cooperative for staff.  "

## 2025-05-20 ENCOUNTER — TELEPHONE (OUTPATIENT)
Dept: MEDICAL GROUP | Facility: PHYSICIAN GROUP | Age: 55
End: 2025-05-20
Payer: OTHER GOVERNMENT

## 2025-05-20 DIAGNOSIS — F41.0 PANIC ATTACK: ICD-10-CM

## 2025-05-20 RX ORDER — HYDROXYZINE HYDROCHLORIDE 25 MG/1
25 TABLET, FILM COATED ORAL 3 TIMES DAILY PRN
Qty: 30 TABLET | Refills: 1 | Status: CANCELLED | OUTPATIENT
Start: 2025-05-20

## 2025-05-20 NOTE — TELEPHONE ENCOUNTER
Received request via: Pharmacy    Was the patient seen in the last year in this department? Yes    Does the patient have an active prescription (recently filled or refills available) for medication(s) requested? No    Pharmacy Name:   [facility]/pharmacy #[id number] - [place] - [address]  [address] [place] [address]  Phone: [phone] Fax: [phone]       Does the patient have FPC Plus and need 100-day supply? (This applies to ALL medications) Patient does not have SCP

## 2025-05-22 NOTE — Clinical Note
REFERRAL APPROVAL NOTICE         Sent on May 22, 2025                   Елена Fowler  [address] [place] [address]                   Dear Ms. Fowler,    After a careful review of the medical information and benefit coverage, [facility] has processed your referral. See below for additional details.    If applicable, you must be actively enrolled with your insurance for coverage of the authorized service. If you have any questions regarding your coverage, please contact your insurance directly.    REFERRAL INFORMATION   Referral #:  [id number]  Referred-To Department    Referred-By Provider:  Behavioral Health    José Antonio Dia M.D.   Behavioral Health Outpatient      [address]  [address]  [place] [id number] [phone] [address]  [place] [id number] [phone]    Referral Start Date:  05/17/2025  Referral End Date:   09/30/2025             SCHEDULING  If you do not already have an appointment, please call [phone] to make an appointment.     MORE INFORMATION  If you do not already have a [facility] account, sign up at: [email or web address]  You can access your medical information, make appointments, see lab results, billing information, and more.  If you have questions regarding this referral, please contact  the [facility] Referrals department at:             [phone]. Monday - Friday 8:00AM - 5:00PM.     Sincerely,    [facility]

## 2025-05-27 ENCOUNTER — OFFICE VISIT (OUTPATIENT)
Dept: MEDICAL GROUP | Facility: PHYSICIAN GROUP | Age: 55
End: 2025-05-27
Payer: OTHER GOVERNMENT

## 2025-05-27 VITALS
DIASTOLIC BLOOD PRESSURE: 82 MMHG | SYSTOLIC BLOOD PRESSURE: 126 MMHG | WEIGHT: 122 LBS | BODY MASS INDEX: 23.95 KG/M2 | HEIGHT: 60 IN | OXYGEN SATURATION: 96 % | TEMPERATURE: 97.4 F | RESPIRATION RATE: 12 BRPM | HEART RATE: 86 BPM

## 2025-05-27 DIAGNOSIS — Z12.31 ENCOUNTER FOR SCREENING MAMMOGRAM FOR MALIGNANT NEOPLASM OF BREAST: ICD-10-CM

## 2025-05-27 DIAGNOSIS — F51.01 PRIMARY INSOMNIA: Primary | ICD-10-CM

## 2025-05-27 DIAGNOSIS — F41.9 ANXIETY: ICD-10-CM

## 2025-05-27 DIAGNOSIS — F41.0 PANIC ATTACK: ICD-10-CM

## 2025-05-27 PROBLEM — K92.1 BLACK STOOL: Status: RESOLVED | Noted: 2023-12-11 | Resolved: 2025-05-27

## 2025-05-27 PROCEDURE — 3074F SYST BP LT 130 MM HG: CPT

## 2025-05-27 PROCEDURE — 3079F DIAST BP 80-89 MM HG: CPT

## 2025-05-27 PROCEDURE — 99214 OFFICE O/P EST MOD 30 MIN: CPT

## 2025-05-27 RX ORDER — HYDROXYZINE HYDROCHLORIDE 25 MG/1
25 TABLET, FILM COATED ORAL 3 TIMES DAILY PRN
Qty: 90 TABLET | Refills: 3 | Status: SHIPPED | OUTPATIENT
Start: 2025-05-27

## 2025-05-27 RX ORDER — TRAZODONE HYDROCHLORIDE 50 MG/1
50 TABLET ORAL NIGHTLY
Qty: 30 TABLET | Refills: 3 | Status: SHIPPED | OUTPATIENT
Start: 2025-05-27

## 2025-05-27 ASSESSMENT — PATIENT HEALTH QUESTIONNAIRE - PHQ9
8. MOVING OR SPEAKING SO SLOWLY THAT OTHER PEOPLE COULD HAVE NOTICED. OR THE OPPOSITE, BEING SO FIGETY OR RESTLESS THAT YOU HAVE BEEN MOVING AROUND A LOT MORE THAN USUAL: NOT AT ALL
1. LITTLE INTEREST OR PLEASURE IN DOING THINGS: SEVERAL DAYS
SUM OF ALL RESPONSES TO PHQ QUESTIONS 1-9: 8
6. FEELING BAD ABOUT YOURSELF - OR THAT YOU ARE A FAILURE OR HAVE LET YOURSELF OR YOUR FAMILY DOWN: NOT AL ALL
2. FEELING DOWN, DEPRESSED, IRRITABLE, OR HOPELESS: SEVERAL DAYS
3. TROUBLE FALLING OR STAYING ASLEEP OR SLEEPING TOO MUCH: NEARLY EVERY DAY
SUM OF ALL RESPONSES TO PHQ9 QUESTIONS 1 AND 2: 2
5. POOR APPETITE OR OVEREATING: SEVERAL DAYS
7. TROUBLE CONCENTRATING ON THINGS, SUCH AS READING THE NEWSPAPER OR WATCHING TELEVISION: NOT AT ALL
4. FEELING TIRED OR HAVING LITTLE ENERGY: MORE THAN HALF THE DAYS
9. THOUGHTS THAT YOU WOULD BE BETTER OFF DEAD, OR OF HURTING YOURSELF: NOT AT ALL

## 2025-05-27 ASSESSMENT — ANXIETY QUESTIONNAIRES
1. FEELING NERVOUS, ANXIOUS, OR ON EDGE: SEVERAL DAYS
7. FEELING AFRAID AS IF SOMETHING AWFUL MIGHT HAPPEN: SEVERAL DAYS
3. WORRYING TOO MUCH ABOUT DIFFERENT THINGS: SEVERAL DAYS
GAD7 TOTAL SCORE: 9
6. BECOMING EASILY ANNOYED OR IRRITABLE: MORE THAN HALF THE DAYS
2. NOT BEING ABLE TO STOP OR CONTROL WORRYING: SEVERAL DAYS
4. TROUBLE RELAXING: SEVERAL DAYS
5. BEING SO RESTLESS THAT IT IS HARD TO SIT STILL: MORE THAN HALF THE DAYS

## 2025-05-27 ASSESSMENT — FIBROSIS 4 INDEX: FIB4 SCORE: 1.62

## 2025-05-27 ASSESSMENT — ENCOUNTER SYMPTOMS
NERVOUS/ANXIOUS: 1
INSOMNIA: 1

## 2025-05-27 NOTE — PROGRESS NOTES
Verbal consent was acquired by the patient to use [employer] ambient listening note generation during this visit     Subjective:     CC: The primary encounter diagnosis was Primary insomnia. Diagnoses of Panic attack, Anxiety, and Encounter for screening mammogram for malignant neoplasm of breast were also pertinent to this visit.    HPI:   Елена presents today with    History of Present Illness  The patient presents for evaluation of anxiety, insomnia, and vaginal dryness.    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 years  - Abstained from alcohol for the past month and marijuana for the past 3 weeks  - Prescribed hydroxyzine by an ER physician for anxiety-related issues, including pacing and sleep disturbances  - Hydroxyzine has helped manage anxiety but not sleep  - Takes hydroxyzine 25 mg 3 times daily  - Initially caused sleep disturbances but now helps her relax  - Tried sertraline but discontinued after 2 days due to jitteriness  - Has not tried BuSpar  - Using cannabis gummies for severe sleep deprivation  - Previously tried Ambien for sleep but not trazodone  - Using magnesium lotion and considering melatonin but unsure of compatibility with hydroxyzine  - Feels slightly irritated before the next dose of hydroxyzine but otherwise well during the day  - Drinking plenty of water and noticed a runny nose upon waking    Insomnia  - Using cannabis gummies for severe sleep deprivation  - Previously tried Ambien for sleep but not trazodone  - Using magnesium lotion and considering melatonin but unsure of compatibility with hydroxyzine    Vaginal Dryness  - Reports hot flashes and vaginal dryness  - No gynecologist and has had a partial hysterectomy  - Using vaginal cream, which is helpful    Supplemental information: Due for  tetanus vaccine, pneumonia vaccine, and mammogram. Taking vitamin D supplements. Not taking Pepcid, famotidine, oxybutynin, Protonix, Pyridium, or Phenergan. Taking valacyclovir for breakouts. No black stools and maintaining a healthy diet. Had influenza B, got severely sick and dehydrated. Given Compazine through IV for hydration. Took dicyclomine for irritable bowel, which has resolved.    SOCIAL HISTORY  The patient has been a drinker of wine for 27 years and smokes pot. She has not consumed alcohol for 1 month and has not smoked for 3 weeks.    Current Medications[1]    Problem   Insomnia    will try ambien 5mg qhs prn, may repeat in 30 min #30, hand written.     Anxiety   Black Stool (Resolved)     ROS:  Review of Systems   Psychiatric/Behavioral:  The patient is nervous/anxious and has insomnia.    All other systems reviewed and are negative.      Objective:     Exam:  /82 (BP Location: Left arm, Patient Position: Sitting, BP Cuff Size: Adult)   Pulse 86   Temp 36.3 °C (97.4 °F) (Temporal)   Resp 12   Ht 1.524 m (5')   Wt 55.3 kg (122 lb)   SpO2 96%   BMI 23.83 kg/m²  Body mass index is 23.83 kg/m².    Physical Exam  Vitals reviewed.   Constitutional:       General: She is not in acute distress.     Appearance: Normal appearance. She is not ill-appearing.   HENT:      Head: Normocephalic and atraumatic.   Cardiovascular:      Rate and Rhythm: Normal rate.      Pulses: Normal pulses.   Pulmonary:      Effort: Pulmonary effort is normal. No respiratory distress.   Skin:     General: Skin is warm and dry.      Findings: No rash.   Neurological:      General: No focal deficit present.      Mental Status: She is alert and oriented to person, place, and time.   Psychiatric:         Mood and Affect: Mood normal.         Behavior: Behavior normal.         Assessment & Plan:     54 y.o. female with the following -     Assessment & Plan  1. Anxiety: Heightened.  - On hydroxyzine 25 mg three times a day,  "which helps manage anxiety but not sleep disturbances.  - Discussed low dose sertraline, previously poorly tolerated. Mentioned BuSpar as an alternative.  - Continue hydroxyzine 25 mg three times a day.  - Prescribed trazodone 50 mg for sleep regulation.  - Recommended magnesium glycinate and melatonin supplements for sleep.    2. Insomnia.  - Prescribed trazodone 50 mg for sleep.  - Advised to try magnesium glycinate and melatonin supplements.  - If no improvement, further medication adjustments will be considered.    3. Vaginal dryness.  - Continue current vaginal cream.  - Discussed oral estrogen therapy for symptoms and anxiety.    4. Health Maintenance.  - Ordered mammogram and advised to schedule at convenience.  - Deferred tetanus and pneumonia vaccines due to aversion to needles.    Follow-up  - Follow up in 3 months or sooner if necessary.    Problem List Items Addressed This Visit       Insomnia - Primary    Chronic, ongoing. Will try trazodone. Discussed recommendation for magnesium glycinate and melatonin as needed for insomnia         Relevant Medications    traZODone (DESYREL) 50 MG Tab    Anxiety    Chronic, unstable. Currently seeing psychology for this.   Has found some relief of symptoms with hydroxyzine 3 times daily as needed.    History of trying sertraline- reports she took 2 tabs and made her feel \"jittery\" and stopped.  Discussed considering buspar for anxiety or an alternate ssri           Relevant Medications    hydrOXYzine HCl (ATARAX) 25 MG Tab    traZODone (DESYREL) 50 MG Tab     Other Visit Diagnoses         Panic attack        Relevant Medications    hydrOXYzine HCl (ATARAX) 25 MG Tab    traZODone (DESYREL) 50 MG Tab      Encounter for screening mammogram for malignant neoplasm of breast        Relevant Orders    MA-SCREENING MAMMO BILAT W/TOMOSYNTHESIS W/CAD          Patient was educated in proper administration of medication(s) ordered today including safety, possible SE, risks, " benefits, rationale and alternatives to therapy.   Supportive care, differential diagnoses, and indications for immediate follow-up discussed with patient.    Pathogenesis of diagnosis discussed including typical length and natural progression.    Instructed to return to clinic or nearest emergency department for any change in condition, further concerns, or worsening of symptoms.  Patient states understanding of the plan of care and discharge instructions.    Return in about 3 months (around 8/27/2025), or if symptoms worsen or fail to improve.    Please note that this dictation was created using voice recognition software. I have made every reasonable attempt to correct obvious errors, but I expect that there are errors of grammar and possibly content that I did not discover before finalizing the note.             [1]   Current Outpatient Medications   Medication Sig Dispense Refill    hydrOXYzine HCl (ATARAX) 25 MG Tab Take 1 Tablet by mouth 3 times a day as needed for Anxiety. 90 Tablet 3    traZODone (DESYREL) 50 MG Tab Take 1 Tablet by mouth every evening. 30 Tablet 3    ondansetron (ZOFRAN ODT) 4 MG TABLET DISPERSIBLE Take 1 Tablet by mouth every 6 hours as needed for Nausea/Vomiting for up to 15 doses. 15 Tablet 0    estradiol (ESTRACE) 0.1 MG/GM vaginal cream Insert 1 g into the vagina every day for 14 days, THEN 1 g two times a week for 30 days. 42.5 g 4    valACYclovir (VALTREX) 500 MG Tab Take 1 Tablet by mouth 2 times a day. Upon onset of prodrome take 500 mg twice daily for 3 days. 3/3/25 FOLLOW UP REQUIRED FOR FUTURE REFILL. 40 Tablet 0     No current facility-administered medications for this visit.

## 2025-05-27 NOTE — ASSESSMENT & PLAN NOTE
"Chronic, unstable. Currently seeing psychology for this.   Has found some relief of symptoms with hydroxyzine 3 times daily as needed.    History of trying sertraline- reports she took 2 tabs and made her feel \"jittery\" and stopped.  Discussed considering buspar for anxiety or an alternate ssri    "

## 2025-05-27 NOTE — ASSESSMENT & PLAN NOTE
Chronic, ongoing. Will try trazodone. Discussed recommendation for magnesium glycinate and melatonin as needed for insomnia

## 2025-06-02 RX ORDER — ESTRADIOL 0.1 MG/G
CREAM VAGINAL
Qty: 42.5 G | Refills: 4 | Status: SHIPPED | OUTPATIENT
Start: 2025-06-02 | End: 2025-07-16

## 2025-07-02 ENCOUNTER — HOSPITAL ENCOUNTER (OUTPATIENT)
Dept: RADIOLOGY | Facility: MEDICAL CENTER | Age: 55
End: 2025-07-02
Payer: OTHER GOVERNMENT

## 2025-07-02 DIAGNOSIS — Z12.31 ENCOUNTER FOR SCREENING MAMMOGRAM FOR MALIGNANT NEOPLASM OF BREAST: ICD-10-CM

## 2025-08-27 ENCOUNTER — APPOINTMENT (OUTPATIENT)
Dept: MEDICAL GROUP | Facility: PHYSICIAN GROUP | Age: 55
End: 2025-08-27
Payer: OTHER GOVERNMENT